# Patient Record
Sex: FEMALE | Race: WHITE | NOT HISPANIC OR LATINO | Employment: OTHER | URBAN - METROPOLITAN AREA
[De-identification: names, ages, dates, MRNs, and addresses within clinical notes are randomized per-mention and may not be internally consistent; named-entity substitution may affect disease eponyms.]

---

## 2023-03-20 LAB
ANION GAP IN SER/PLAS: 16 MMOL/L (ref 10–20)
CALCIUM (MG/DL) IN SER/PLAS: 9.9 MG/DL (ref 8.6–10.6)
CARBON DIOXIDE, TOTAL (MMOL/L) IN SER/PLAS: 29 MMOL/L (ref 21–32)
CHLORIDE (MMOL/L) IN SER/PLAS: 105 MMOL/L (ref 98–107)
CREATININE (MG/DL) IN SER/PLAS: 0.84 MG/DL (ref 0.5–1.05)
ERYTHROCYTE DISTRIBUTION WIDTH (RATIO) BY AUTOMATED COUNT: 13.3 % (ref 11.5–14.5)
ERYTHROCYTE MEAN CORPUSCULAR HEMOGLOBIN CONCENTRATION (G/DL) BY AUTOMATED: 30.8 G/DL (ref 32–36)
ERYTHROCYTE MEAN CORPUSCULAR VOLUME (FL) BY AUTOMATED COUNT: 99 FL (ref 80–100)
ERYTHROCYTES (10*6/UL) IN BLOOD BY AUTOMATED COUNT: 4.09 X10E12/L (ref 4–5.2)
ESTIMATED AVERAGE GLUCOSE FOR HBA1C: 108 MG/DL
GFR FEMALE: 73 ML/MIN/1.73M2
GLUCOSE (MG/DL) IN SER/PLAS: 104 MG/DL (ref 74–99)
HEMATOCRIT (%) IN BLOOD BY AUTOMATED COUNT: 40.3 % (ref 36–46)
HEMOGLOBIN (G/DL) IN BLOOD: 12.4 G/DL (ref 12–16)
HEMOGLOBIN A1C/HEMOGLOBIN TOTAL IN BLOOD: 5.4 %
LEUKOCYTES (10*3/UL) IN BLOOD BY AUTOMATED COUNT: 9.6 X10E9/L (ref 4.4–11.3)
NATRIURETIC PEPTIDE B (PG/ML) IN SER/PLAS: 69 PG/ML (ref 0–99)
NRBC (PER 100 WBCS) BY AUTOMATED COUNT: 0 /100 WBC (ref 0–0)
PLATELETS (10*3/UL) IN BLOOD AUTOMATED COUNT: 246 X10E9/L (ref 150–450)
POTASSIUM (MMOL/L) IN SER/PLAS: 4.9 MMOL/L (ref 3.5–5.3)
SODIUM (MMOL/L) IN SER/PLAS: 145 MMOL/L (ref 136–145)
UREA NITROGEN (MG/DL) IN SER/PLAS: 33 MG/DL (ref 6–23)

## 2023-08-30 LAB
ANION GAP IN SER/PLAS: 13 MMOL/L (ref 10–20)
BASOPHILS (10*3/UL) IN BLOOD BY AUTOMATED COUNT: 0.07 X10E9/L (ref 0–0.1)
BASOPHILS/100 LEUKOCYTES IN BLOOD BY AUTOMATED COUNT: 0.7 % (ref 0–2)
CALCIDIOL (25 OH VITAMIN D3) (NG/ML) IN SER/PLAS: 21 NG/ML
CALCIUM (MG/DL) IN SER/PLAS: 9.9 MG/DL (ref 8.6–10.6)
CARBON DIOXIDE, TOTAL (MMOL/L) IN SER/PLAS: 30 MMOL/L (ref 21–32)
CHLORIDE (MMOL/L) IN SER/PLAS: 103 MMOL/L (ref 98–107)
CREATININE (MG/DL) IN SER/PLAS: 0.94 MG/DL (ref 0.5–1.05)
EOSINOPHILS (10*3/UL) IN BLOOD BY AUTOMATED COUNT: 0.14 X10E9/L (ref 0–0.4)
EOSINOPHILS/100 LEUKOCYTES IN BLOOD BY AUTOMATED COUNT: 1.4 % (ref 0–6)
ERYTHROCYTE DISTRIBUTION WIDTH (RATIO) BY AUTOMATED COUNT: 13.3 % (ref 11.5–14.5)
ERYTHROCYTE MEAN CORPUSCULAR HEMOGLOBIN CONCENTRATION (G/DL) BY AUTOMATED: 30.9 G/DL (ref 32–36)
ERYTHROCYTE MEAN CORPUSCULAR VOLUME (FL) BY AUTOMATED COUNT: 101 FL (ref 80–100)
ERYTHROCYTES (10*6/UL) IN BLOOD BY AUTOMATED COUNT: 4.13 X10E12/L (ref 4–5.2)
GFR FEMALE: 64 ML/MIN/1.73M2
GLUCOSE (MG/DL) IN SER/PLAS: 84 MG/DL (ref 74–99)
HEMATOCRIT (%) IN BLOOD BY AUTOMATED COUNT: 41.8 % (ref 36–46)
HEMOGLOBIN (G/DL) IN BLOOD: 12.9 G/DL (ref 12–16)
IMMATURE GRANULOCYTES/100 LEUKOCYTES IN BLOOD BY AUTOMATED COUNT: 0.6 % (ref 0–0.9)
LEUKOCYTES (10*3/UL) IN BLOOD BY AUTOMATED COUNT: 9.7 X10E9/L (ref 4.4–11.3)
LYMPHOCYTES (10*3/UL) IN BLOOD BY AUTOMATED COUNT: 2.5 X10E9/L (ref 0.8–3)
LYMPHOCYTES/100 LEUKOCYTES IN BLOOD BY AUTOMATED COUNT: 25.7 % (ref 13–44)
MONOCYTES (10*3/UL) IN BLOOD BY AUTOMATED COUNT: 0.74 X10E9/L (ref 0.05–0.8)
MONOCYTES/100 LEUKOCYTES IN BLOOD BY AUTOMATED COUNT: 7.6 % (ref 2–10)
NEUTROPHILS (10*3/UL) IN BLOOD BY AUTOMATED COUNT: 6.21 X10E9/L (ref 1.6–5.5)
NEUTROPHILS/100 LEUKOCYTES IN BLOOD BY AUTOMATED COUNT: 64 % (ref 40–80)
NRBC (PER 100 WBCS) BY AUTOMATED COUNT: 0 /100 WBC (ref 0–0)
PLATELETS (10*3/UL) IN BLOOD AUTOMATED COUNT: 250 X10E9/L (ref 150–450)
POTASSIUM (MMOL/L) IN SER/PLAS: 4.7 MMOL/L (ref 3.5–5.3)
SODIUM (MMOL/L) IN SER/PLAS: 141 MMOL/L (ref 136–145)
URATE (MG/DL) IN SER/PLAS: 7.4 MG/DL (ref 2.3–6.7)
UREA NITROGEN (MG/DL) IN SER/PLAS: 43 MG/DL (ref 6–23)

## 2023-11-03 RX ORDER — LISINOPRIL AND HYDROCHLOROTHIAZIDE 20; 25 MG/1; MG/1
1 TABLET ORAL DAILY
Qty: 90 TABLET | Refills: 2 | Status: CANCELLED | OUTPATIENT
Start: 2023-11-03

## 2023-11-03 RX ORDER — LISINOPRIL AND HYDROCHLOROTHIAZIDE 20; 25 MG/1; MG/1
1 TABLET ORAL DAILY
COMMUNITY
Start: 2022-01-10 | End: 2024-01-02 | Stop reason: SDUPTHER

## 2023-12-31 DIAGNOSIS — I10 ESSENTIAL (PRIMARY) HYPERTENSION: ICD-10-CM

## 2024-01-02 RX ORDER — LISINOPRIL AND HYDROCHLOROTHIAZIDE 20; 25 MG/1; MG/1
TABLET ORAL
Qty: 90 TABLET | Refills: 2 | Status: SHIPPED | OUTPATIENT
Start: 2024-01-02

## 2024-06-05 ENCOUNTER — HOSPITAL ENCOUNTER (OUTPATIENT)
Dept: RADIOLOGY | Facility: CLINIC | Age: 74
Discharge: HOME | End: 2024-06-05
Payer: MEDICARE

## 2024-06-05 DIAGNOSIS — M25.561 RIGHT KNEE PAIN, UNSPECIFIED CHRONICITY: ICD-10-CM

## 2024-06-05 PROCEDURE — 73564 X-RAY EXAM KNEE 4 OR MORE: CPT | Mod: RT

## 2024-06-05 PROCEDURE — 73564 X-RAY EXAM KNEE 4 OR MORE: CPT | Mod: RIGHT SIDE | Performed by: INTERNAL MEDICINE

## 2024-07-12 ENCOUNTER — APPOINTMENT (OUTPATIENT)
Dept: ORTHOPEDIC SURGERY | Facility: CLINIC | Age: 74
End: 2024-07-12
Payer: MEDICARE

## 2024-07-12 DIAGNOSIS — M25.561 RIGHT KNEE PAIN, UNSPECIFIED CHRONICITY: ICD-10-CM

## 2024-07-26 ENCOUNTER — APPOINTMENT (OUTPATIENT)
Dept: ORTHOPEDIC SURGERY | Facility: CLINIC | Age: 74
End: 2024-07-26
Payer: MEDICARE

## 2024-08-13 ENCOUNTER — APPOINTMENT (OUTPATIENT)
Dept: ORTHOPEDIC SURGERY | Facility: CLINIC | Age: 74
End: 2024-08-13
Payer: MEDICARE

## 2024-08-27 ENCOUNTER — APPOINTMENT (OUTPATIENT)
Dept: ORTHOPEDIC SURGERY | Facility: CLINIC | Age: 74
End: 2024-08-27
Payer: MEDICARE

## 2024-09-01 DIAGNOSIS — M85.80 OTHER SPECIFIED DISORDERS OF BONE DENSITY AND STRUCTURE, UNSPECIFIED SITE: ICD-10-CM

## 2024-09-03 RX ORDER — CALCIUM CARBONATE/VITAMIN D3 600MG-5MCG
1 TABLET ORAL DAILY
Qty: 90 TABLET | Refills: 3 | Status: SHIPPED | OUTPATIENT
Start: 2024-09-03

## 2024-09-12 ENCOUNTER — APPOINTMENT (OUTPATIENT)
Dept: OPHTHALMOLOGY | Facility: CLINIC | Age: 74
End: 2024-09-12
Payer: MEDICARE

## 2024-09-12 DIAGNOSIS — H52.11 MYOPIA OF RIGHT EYE WITH ASTIGMATISM AND PRESBYOPIA: Primary | ICD-10-CM

## 2024-09-12 DIAGNOSIS — H04.123 DRY EYE SYNDROME OF BOTH EYES: ICD-10-CM

## 2024-09-12 DIAGNOSIS — Z96.1 PSEUDOPHAKIA OF BOTH EYES: ICD-10-CM

## 2024-09-12 DIAGNOSIS — H52.201 MYOPIA OF RIGHT EYE WITH ASTIGMATISM AND PRESBYOPIA: Primary | ICD-10-CM

## 2024-09-12 DIAGNOSIS — Z98.890 HISTORY OF LASER ASSISTED IN SITU KERATOMILEUSIS: ICD-10-CM

## 2024-09-12 DIAGNOSIS — H16.223 KERATOCONJUNCTIVITIS SICCA OF BOTH EYES NOT SPECIFIED AS SJOGREN'S: ICD-10-CM

## 2024-09-12 DIAGNOSIS — H52.4 MYOPIA OF RIGHT EYE WITH ASTIGMATISM AND PRESBYOPIA: Primary | ICD-10-CM

## 2024-09-12 PROBLEM — M85.80 OSTEOPENIA: Status: ACTIVE | Noted: 2024-09-12

## 2024-09-12 PROBLEM — H40.003 GLAUCOMA SUSPECT OF BOTH EYES: Status: ACTIVE | Noted: 2024-09-12

## 2024-09-12 PROBLEM — M17.9 KNEE OSTEOARTHRITIS: Status: ACTIVE | Noted: 2024-09-12

## 2024-09-12 PROBLEM — R53.83 FATIGUE: Status: ACTIVE | Noted: 2024-09-12

## 2024-09-12 PROBLEM — M35.3 POLYMYALGIA RHEUMATICA (MULTI): Status: ACTIVE | Noted: 2024-09-12

## 2024-09-12 PROBLEM — I10 HYPERTENSION: Status: ACTIVE | Noted: 2024-09-12

## 2024-09-12 PROBLEM — R51.9 HEADACHE: Status: ACTIVE | Noted: 2024-09-12

## 2024-09-12 PROCEDURE — 92015 DETERMINE REFRACTIVE STATE: CPT | Performed by: OPTOMETRIST

## 2024-09-12 PROCEDURE — 92004 COMPRE OPH EXAM NEW PT 1/>: CPT | Performed by: OPTOMETRIST

## 2024-09-12 RX ORDER — CYCLOSPORINE 0.5 MG/ML
1 EMULSION OPHTHALMIC EVERY 12 HOURS
Qty: 180 EACH | Refills: 0 | Status: SHIPPED | OUTPATIENT
Start: 2024-09-12 | End: 2024-12-11

## 2024-09-12 ASSESSMENT — CONF VISUAL FIELD
OS_INFERIOR_TEMPORAL_RESTRICTION: 0
OD_INFERIOR_TEMPORAL_RESTRICTION: 0
METHOD: COUNTING FINGERS
OD_SUPERIOR_TEMPORAL_RESTRICTION: 0
OD_NORMAL: 1
OS_SUPERIOR_NASAL_RESTRICTION: 0
OD_SUPERIOR_NASAL_RESTRICTION: 0
OS_INFERIOR_NASAL_RESTRICTION: 0
OS_SUPERIOR_TEMPORAL_RESTRICTION: 0
OS_NORMAL: 1
OD_INFERIOR_NASAL_RESTRICTION: 0

## 2024-09-12 ASSESSMENT — REFRACTION_MANIFEST
OD_SPHERE: -0.50
OS_ADD: +2.75
OS_SPHERE: +0.00
METHOD_AUTOREFRACTION: 1
OD_AXIS: 121
OD_ADD: +2.75
OD_CYLINDER: +1.50
OD_AXIS: 145
OD_SPHERE: -0.75
OS_SPHERE: +0.00
OS_CYLINDER: +0.25
OS_AXIS: 035
OS_CYLINDER: SPHERE
OD_CYLINDER: +1.50

## 2024-09-12 ASSESSMENT — REFRACTION_WEARINGRX
OS_SPHERE: +0.00
OD_SPHERE: -1.00
OS_AXIS: 175
SPECS_TYPE: PAL
OS_CYLINDER: +0.25
OD_AXIS: 140
OD_CYLINDER: +2.25
OD_ADD: +2.75
OS_ADD: +2.75

## 2024-09-12 ASSESSMENT — ENCOUNTER SYMPTOMS
PSYCHIATRIC NEGATIVE: 0
CONSTITUTIONAL NEGATIVE: 0
ENDOCRINE NEGATIVE: 0
CARDIOVASCULAR NEGATIVE: 0
MUSCULOSKELETAL NEGATIVE: 0
GASTROINTESTINAL NEGATIVE: 0
ALLERGIC/IMMUNOLOGIC NEGATIVE: 0
NEUROLOGICAL NEGATIVE: 0
HEMATOLOGIC/LYMPHATIC NEGATIVE: 0
RESPIRATORY NEGATIVE: 0
EYES NEGATIVE: 1

## 2024-09-12 ASSESSMENT — VISUAL ACUITY
METHOD: SNELLEN - LINEAR
OS_CC+: -2
OD_CC: 20/20
OS_CC: 20/20
CORRECTION_TYPE: GLASSES
OD_CC+: -2

## 2024-09-12 ASSESSMENT — CUP TO DISC RATIO
OD_RATIO: 0.1
OS_RATIO: 0.2

## 2024-09-12 ASSESSMENT — SLIT LAMP EXAM - LIDS
COMMENTS: NORMAL
COMMENTS: NORMAL

## 2024-09-12 ASSESSMENT — TONOMETRY
OD_IOP_MMHG: 16
OS_IOP_MMHG: 16
IOP_METHOD: GOLDMANN APPLANATION

## 2024-09-12 ASSESSMENT — EXTERNAL EXAM - LEFT EYE: OS_EXAM: NORMAL

## 2024-09-12 ASSESSMENT — EXTERNAL EXAM - RIGHT EYE: OD_EXAM: NORMAL

## 2024-09-12 NOTE — PROGRESS NOTES
Assessment/Plan   Diagnoses and all orders for this visit:  Myopia of right eye with astigmatism and presbyopia  New spec rx released today per patient request. Ocular health wnl for age OU. Monitor 1 year or sooner prn. Refraction billed today.    Dry eye syndrome of both eyes  Keratoconjunctivitis sicca of both eyes not specified as Sjogren's  -     cycloSPORINE (Restasis) 0.05 % ophthalmic emulsion; Administer 1 drop into both eyes every 12 hours.  Patient educated on exam findings. Patient failed treatement with aqueous and lipid based Ats for 120 days. Start Restasis OU  bid, previous use. Discussed SEs of drug. Discussed lag for full efficacy of drug. RTC 1 year or sooner if symptoms do not improve.     Pseudophakia of both eyes  History of laser assisted in situ keratomileusis  Stable. Monitor.    Per Dr Paty Camarillo, pt does not need office visit, can schedule and obtain Eliquis clearance

## 2024-09-28 NOTE — PROGRESS NOTES
"Subjective   Patient ID: Frances Coughlin is a 74 y.o. female who presents for New Patient Visit (Re-establish care for PMR. ).    HPI  Consult  RE \"PMR\"  I last saw her in Aug 8, 2021   · Polymyalgia rheumatica (725) (M35.3)   · On prednisone therapy (V58.65) (Z79.52)   · Primary osteoarthritis of first carpometacarpal joint of right hand (715.14) (M18.11)   · Knee osteoarthritis (715.36) (M17.10)    Tapered off pred 2022  Did well.     Joint pain ankles hips shoulder wrist hands thumbs   with dementia so did not come in  Onset 1.5 years         L knee TKR 2021    Daily joint pain  Worse with walking and Occ at night   AM gel 30 minutes     Vertigo    Worst is shoulder, ankles and hips     1 year ago flexion r 4th digit         ROS      Current Outpatient Medications   Medication Sig Dispense Refill    calcium carbonate-vitamin D3 600 mg-5 mcg (200 unit) tablet TAKE 1 TABLET DAILY 90 tablet 3    cycloSPORINE (Restasis) 0.05 % ophthalmic emulsion Administer 1 drop into both eyes every 12 hours. 180 each 0    lisinopriL-hydrochlorothiazide 20-25 mg tablet TAKE 1 TABLET BY MOUTH EVERY DAY---PT NEEDS APPT WITH NEW DOCTOR 90 tablet 2     No current facility-administered medications for this visit.         has a past medical history of Other specified personal risk factors, not elsewhere classified (11/12/2019).   Past Surgical History:   Procedure Laterality Date    KNEE ARTHROSCOPY W/ DEBRIDEMENT  09/22/2014    Arthroscopy Knee      Social History     Tobacco Use    Smoking status: Never    Smokeless tobacco: Never      family history is not on file.  Pain Management Panel           No data to display                 Vitals:    10/02/24 0759   BP: 137/77   Pulse: 69     Lab Results   Component Value Date    SEDRATE 11 07/07/2020    CRP 0.54 07/07/2020       PHYSICAL EXAM      NODES   HEART  LUNGS  ABDOMEN   VASCULAR  NEURO   SKIN  JOINTS normal range of motion of both shoulders with pain  Flexion deformity " of right fourth DIP  Squaring of CMC joint  Crepitus of left knee which has a total knee joint replacement  Normal range of motion of both ankles      PLAN  Diagnoses and all orders for this visit:  Primary osteoarthritis involving multiple joints  -     C-Reactive Protein; Future  -     CBC and Auto Differential; Future  -     Comprehensive Metabolic Panel; Future  -     Sedimentation Rate; Future  -     Referral to Physical Therapy; Future  Elevated C-reactive protein (CRP)  -     CBC and Auto Differential; Future    I do not find any clinical manifestations based on history or physical examination of recurrent polymyalgia rheumatica.    I last saw her 3 years ago and her PMR was controlled and she weaned herself off of prednisone and has been off it now for 2 years    I believe this is osteoarthritis rather than PMR and suggest physical therapy  She takes aspirin and although I cautioned her about GI toxicity she will continue to take this since it gives her excellent relief    I briefly discussed possibly adding tramadol if the physical therapy is not helping    I will check some lab work as the last lab work was in 2023 and will include sed rate and C-reactive protein    [facility]

## 2024-10-02 ENCOUNTER — APPOINTMENT (OUTPATIENT)
Dept: RHEUMATOLOGY | Facility: CLINIC | Age: 74
End: 2024-10-02
Payer: MEDICARE

## 2024-10-02 VITALS
DIASTOLIC BLOOD PRESSURE: 77 MMHG | HEART RATE: 69 BPM | SYSTOLIC BLOOD PRESSURE: 137 MMHG | HEIGHT: 62 IN | BODY MASS INDEX: 30.73 KG/M2 | WEIGHT: 167 LBS

## 2024-10-02 DIAGNOSIS — R79.82 ELEVATED C-REACTIVE PROTEIN (CRP): ICD-10-CM

## 2024-10-02 DIAGNOSIS — M15.0 PRIMARY OSTEOARTHRITIS INVOLVING MULTIPLE JOINTS: Primary | ICD-10-CM

## 2024-10-02 PROCEDURE — 1036F TOBACCO NON-USER: CPT | Performed by: INTERNAL MEDICINE

## 2024-10-02 PROCEDURE — 1125F AMNT PAIN NOTED PAIN PRSNT: CPT | Performed by: INTERNAL MEDICINE

## 2024-10-02 PROCEDURE — 1159F MED LIST DOCD IN RCRD: CPT | Performed by: INTERNAL MEDICINE

## 2024-10-02 PROCEDURE — 3008F BODY MASS INDEX DOCD: CPT | Performed by: INTERNAL MEDICINE

## 2024-10-02 PROCEDURE — 99204 OFFICE O/P NEW MOD 45 MIN: CPT | Performed by: INTERNAL MEDICINE

## 2024-10-02 PROCEDURE — 3078F DIAST BP <80 MM HG: CPT | Performed by: INTERNAL MEDICINE

## 2024-10-02 PROCEDURE — 3075F SYST BP GE 130 - 139MM HG: CPT | Performed by: INTERNAL MEDICINE

## 2024-10-02 ASSESSMENT — PAIN SCALES - GENERAL: PAINLEVEL: 4

## 2024-10-03 ENCOUNTER — HOSPITAL ENCOUNTER (OUTPATIENT)
Dept: RADIOLOGY | Facility: CLINIC | Age: 74
Discharge: HOME | End: 2024-10-03
Payer: MEDICARE

## 2024-10-03 ENCOUNTER — OFFICE VISIT (OUTPATIENT)
Dept: PRIMARY CARE | Facility: CLINIC | Age: 74
End: 2024-10-03
Payer: MEDICARE

## 2024-10-03 VITALS
HEIGHT: 63 IN | TEMPERATURE: 98.3 F | SYSTOLIC BLOOD PRESSURE: 114 MMHG | RESPIRATION RATE: 16 BRPM | OXYGEN SATURATION: 96 % | DIASTOLIC BLOOD PRESSURE: 69 MMHG | WEIGHT: 167.8 LBS | HEART RATE: 78 BPM | BODY MASS INDEX: 29.73 KG/M2

## 2024-10-03 DIAGNOSIS — I10 PRIMARY HYPERTENSION: Primary | ICD-10-CM

## 2024-10-03 DIAGNOSIS — R06.2 WHEEZING: ICD-10-CM

## 2024-10-03 DIAGNOSIS — R06.02 SHORTNESS OF BREATH: ICD-10-CM

## 2024-10-03 DIAGNOSIS — R60.0 LOWER LEG EDEMA: ICD-10-CM

## 2024-10-03 DIAGNOSIS — H81.13 BENIGN PAROXYSMAL POSITIONAL VERTIGO DUE TO BILATERAL VESTIBULAR DISORDER: ICD-10-CM

## 2024-10-03 DIAGNOSIS — R79.82 ELEVATED C-REACTIVE PROTEIN (CRP): ICD-10-CM

## 2024-10-03 DIAGNOSIS — Z00.00 ROUTINE GENERAL MEDICAL EXAMINATION AT HEALTH CARE FACILITY: ICD-10-CM

## 2024-10-03 DIAGNOSIS — Z12.31 ENCOUNTER FOR SCREENING MAMMOGRAM FOR MALIGNANT NEOPLASM OF BREAST: ICD-10-CM

## 2024-10-03 DIAGNOSIS — R73.9 HYPERGLYCEMIA: ICD-10-CM

## 2024-10-03 DIAGNOSIS — M15.0 PRIMARY OSTEOARTHRITIS INVOLVING MULTIPLE JOINTS: ICD-10-CM

## 2024-10-03 DIAGNOSIS — E55.9 VITAMIN D DEFICIENCY: ICD-10-CM

## 2024-10-03 DIAGNOSIS — H61.21 IMPACTED CERUMEN OF RIGHT EAR: ICD-10-CM

## 2024-10-03 LAB
25(OH)D3 SERPL-MCNC: 27 NG/ML (ref 30–100)
BASOPHILS # BLD AUTO: 0.06 X10*3/UL (ref 0–0.1)
BASOPHILS NFR BLD AUTO: 0.7 %
EOSINOPHIL # BLD AUTO: 0.18 X10*3/UL (ref 0–0.4)
EOSINOPHIL NFR BLD AUTO: 2.2 %
ERYTHROCYTE [DISTWIDTH] IN BLOOD BY AUTOMATED COUNT: 13.2 % (ref 11.5–14.5)
ERYTHROCYTE [SEDIMENTATION RATE] IN BLOOD BY WESTERGREN METHOD: 29 MM/H (ref 0–30)
HCT VFR BLD AUTO: 40.7 % (ref 36–46)
HGB BLD-MCNC: 12.8 G/DL (ref 12–16)
IMM GRANULOCYTES # BLD AUTO: 0.04 X10*3/UL (ref 0–0.5)
IMM GRANULOCYTES NFR BLD AUTO: 0.5 % (ref 0–0.9)
LYMPHOCYTES # BLD AUTO: 2.06 X10*3/UL (ref 0.8–3)
LYMPHOCYTES NFR BLD AUTO: 25.2 %
MCH RBC QN AUTO: 30.5 PG (ref 26–34)
MCHC RBC AUTO-ENTMCNC: 31.4 G/DL (ref 32–36)
MCV RBC AUTO: 97 FL (ref 80–100)
MONOCYTES # BLD AUTO: 0.63 X10*3/UL (ref 0.05–0.8)
MONOCYTES NFR BLD AUTO: 7.7 %
NEUTROPHILS # BLD AUTO: 5.21 X10*3/UL (ref 1.6–5.5)
NEUTROPHILS NFR BLD AUTO: 63.7 %
NRBC BLD-RTO: 0 /100 WBCS (ref 0–0)
PLATELET # BLD AUTO: 235 X10*3/UL (ref 150–450)
RBC # BLD AUTO: 4.2 X10*6/UL (ref 4–5.2)
WBC # BLD AUTO: 8.2 X10*3/UL (ref 4.4–11.3)

## 2024-10-03 PROCEDURE — 90662 IIV NO PRSV INCREASED AG IM: CPT | Performed by: NURSE PRACTITIONER

## 2024-10-03 PROCEDURE — 80061 LIPID PANEL: CPT | Performed by: NURSE PRACTITIONER

## 2024-10-03 PROCEDURE — 1159F MED LIST DOCD IN RCRD: CPT | Performed by: NURSE PRACTITIONER

## 2024-10-03 PROCEDURE — 3074F SYST BP LT 130 MM HG: CPT | Performed by: NURSE PRACTITIONER

## 2024-10-03 PROCEDURE — G0439 PPPS, SUBSEQ VISIT: HCPCS | Performed by: NURSE PRACTITIONER

## 2024-10-03 PROCEDURE — 85652 RBC SED RATE AUTOMATED: CPT | Performed by: NURSE PRACTITIONER

## 2024-10-03 PROCEDURE — 36415 COLL VENOUS BLD VENIPUNCTURE: CPT | Performed by: NURSE PRACTITIONER

## 2024-10-03 PROCEDURE — 82306 VITAMIN D 25 HYDROXY: CPT | Performed by: NURSE PRACTITIONER

## 2024-10-03 PROCEDURE — 99215 OFFICE O/P EST HI 40 MIN: CPT | Performed by: NURSE PRACTITIONER

## 2024-10-03 PROCEDURE — 71046 X-RAY EXAM CHEST 2 VIEWS: CPT

## 2024-10-03 PROCEDURE — 83036 HEMOGLOBIN GLYCOSYLATED A1C: CPT | Performed by: NURSE PRACTITIONER

## 2024-10-03 PROCEDURE — 3078F DIAST BP <80 MM HG: CPT | Performed by: NURSE PRACTITIONER

## 2024-10-03 PROCEDURE — 3008F BODY MASS INDEX DOCD: CPT | Performed by: NURSE PRACTITIONER

## 2024-10-03 PROCEDURE — 85025 COMPLETE CBC W/AUTO DIFF WBC: CPT | Performed by: NURSE PRACTITIONER

## 2024-10-03 PROCEDURE — 1125F AMNT PAIN NOTED PAIN PRSNT: CPT | Performed by: NURSE PRACTITIONER

## 2024-10-03 PROCEDURE — 86140 C-REACTIVE PROTEIN: CPT | Performed by: NURSE PRACTITIONER

## 2024-10-03 PROCEDURE — 80053 COMPREHEN METABOLIC PANEL: CPT | Performed by: NURSE PRACTITIONER

## 2024-10-03 RX ORDER — FUROSEMIDE 20 MG/1
20 TABLET ORAL DAILY
Qty: 90 TABLET | Refills: 2 | Status: SHIPPED | OUTPATIENT
Start: 2024-10-03

## 2024-10-03 RX ORDER — ALBUTEROL SULFATE 90 UG/1
2 INHALANT RESPIRATORY (INHALATION) EVERY 4 HOURS PRN
Qty: 8 G | Refills: 5 | Status: SHIPPED | OUTPATIENT
Start: 2024-10-03 | End: 2025-10-03

## 2024-10-03 RX ORDER — MECLIZINE HCL 12.5 MG 12.5 MG/1
12.5 TABLET ORAL 3 TIMES DAILY PRN
Qty: 30 TABLET | Refills: 11 | Status: SHIPPED | OUTPATIENT
Start: 2024-10-03 | End: 2025-10-03

## 2024-10-03 RX ORDER — FLUTICASONE PROPIONATE 50 MCG
1 SPRAY, SUSPENSION (ML) NASAL DAILY
Qty: 16 G | Refills: 11 | Status: SHIPPED | OUTPATIENT
Start: 2024-10-03 | End: 2025-10-03

## 2024-10-03 RX ORDER — ASPIRIN 81 MG
5 TABLET, DELAYED RELEASE (ENTERIC COATED) ORAL 2 TIMES DAILY
Qty: 15 ML | Refills: 0 | Status: SHIPPED | OUTPATIENT
Start: 2024-10-03 | End: 2024-10-13

## 2024-10-03 ASSESSMENT — ENCOUNTER SYMPTOMS
DEPRESSION: 0
LOSS OF SENSATION IN FEET: 0
OCCASIONAL FEELINGS OF UNSTEADINESS: 0

## 2024-10-03 ASSESSMENT — PATIENT HEALTH QUESTIONNAIRE - PHQ9
SUM OF ALL RESPONSES TO PHQ9 QUESTIONS 1 AND 2: 0
2. FEELING DOWN, DEPRESSED OR HOPELESS: NOT AT ALL
1. LITTLE INTEREST OR PLEASURE IN DOING THINGS: NOT AT ALL

## 2024-10-03 ASSESSMENT — PAIN SCALES - GENERAL: PAINLEVEL: 4

## 2024-10-03 NOTE — PROGRESS NOTES
"Subjective   Frances Coughlin is a 74 y.o. female who presents for Annual Exam and Flu Vaccine.  HPI  Ms. Coughlin is a 73 yo F here today for annual wellness.   Notes that about 3 weeks ago she experienced vertigo. Reports sensations of the room spinning with nausea. She notes worsening of the symptoms when she bent over. She tried epley maneuver on her own. Notes that she was then prescribed meclizine. She notes that symptoms have generally improved but still occur when she bends over and can occur moreso with positional change (sitting to standing).    Denies ear pain but notes some change in hearing from the R ear. Denies ear drainage.   She is very interested in vestibular therapy.     She is following for OA with Dr. Al. She feels that this is now better managed.     Additionally notes some recent shortness of breath/ change in breathing. Denies recent illness, fever, or chills.     All systems reviewed. Review of systems negative except for noted positives in HPI    Objective     /69   Pulse 78   Temp 36.8 °C (98.3 °F)   Resp 16   Ht 1.588 m (5' 2.5\")   Wt 76.1 kg (167 lb 12.8 oz)   SpO2 96%   BMI 30.20 kg/m²    Vital signs noted and reviewed.       Physical Exam  Constitutional:       Appearance: Normal appearance.   HENT:      Right Ear: There is impacted cerumen.   Cardiovascular:      Rate and Rhythm: Normal rate and regular rhythm.   Pulmonary:      Effort: Pulmonary effort is normal. No respiratory distress.      Breath sounds: Normal breath sounds.   Skin:     General: Skin is warm and dry.   Neurological:      Mental Status: She is oriented to person, place, and time.   Psychiatric:         Mood and Affect: Mood normal.             Assessment/Plan   Problem List Items Addressed This Visit       Hypertension - Primary    Relevant Orders    Lipid Panel (Completed)    Hemoglobin A1C (Completed)     Other Visit Diagnoses       Benign paroxysmal positional vertigo due to bilateral " vestibular disorder        Relevant Medications    meclizine (Antivert) 12.5 mg tablet    fluticasone (Flonase) 50 mcg/actuation nasal spray    Other Relevant Orders    Referral to Physical Therapy    Impacted cerumen of right ear        Relevant Medications    carbamide peroxide (Debrox) 6.5 % otic solution    Other Relevant Orders    Referral to ENT    Wheezing        Relevant Medications    albuterol (Ventolin HFA) 90 mcg/actuation inhaler    Other Relevant Orders    XR chest 2 views (Completed)    Shortness of breath        Relevant Medications    albuterol (Ventolin HFA) 90 mcg/actuation inhaler    Other Relevant Orders    XR chest 2 views (Completed)    Lower leg edema        Relevant Medications    furosemide (Lasix) 20 mg tablet    Other Relevant Orders    Elastic Bandage 5 in or greater    Vitamin D deficiency        Relevant Orders    Vitamin D 25-Hydroxy,Total (for eval of Vitamin D levels) (Completed)    Encounter for screening mammogram for malignant neoplasm of breast        Relevant Orders    BI mammo bilateral screening tomosynthesis    Hyperglycemia        Relevant Orders    Hemoglobin A1C (Completed)    Primary osteoarthritis involving multiple joints        Elevated C-reactive protein (CRP)        Routine general medical examination at health care facility        Relevant Orders    1 Year Follow Up In Primary Care - Wellness Exam

## 2024-10-03 NOTE — PATIENT INSTRUCTIONS
Thank you for coming in for your visit today!    Please follow up in 6 months or sooner if needed.     Use the lasix, as discussed.   Try ACE bandages and/or compression socks.   For your blood pressure:  Take your medication as directed. Try to take it around the same time daily.   Keep a log of your blood pressure. Be sure to bring it with you to your next appointment so we can review it together.  Adhere to the DASH diet. This includes decreasing your salt/sodium intake. Avoid canned foods, lunch meats, and frozen foods.  Exercise for 30 minutes daily.    Today we completed blood work. We will contact you with any abnormalities from this testing.    You may use an inhaler, as needed for wheezing/ shortness of breath.     Take meclizine for vertigo.   Use debrox to loosen ear wax.   Plan for consultation with ENT for removal.   If needed, you may schedule for vestibular therapy.    Call 911 or go to the emergency room if you have pain in your chest, difficulty breathing, or other life threatening symptoms.

## 2024-10-04 LAB
ALBUMIN SERPL BCP-MCNC: 4.7 G/DL (ref 3.4–5)
ALP SERPL-CCNC: 47 U/L (ref 33–136)
ALT SERPL W P-5'-P-CCNC: 14 U/L (ref 7–45)
ANION GAP SERPL CALC-SCNC: 16 MMOL/L (ref 10–20)
AST SERPL W P-5'-P-CCNC: 14 U/L (ref 9–39)
BILIRUB SERPL-MCNC: 0.9 MG/DL (ref 0–1.2)
BUN SERPL-MCNC: 29 MG/DL (ref 6–23)
CALCIUM SERPL-MCNC: 9.9 MG/DL (ref 8.6–10.6)
CHLORIDE SERPL-SCNC: 104 MMOL/L (ref 98–107)
CHOLEST SERPL-MCNC: 211 MG/DL (ref 0–199)
CHOLESTEROL/HDL RATIO: 4.8
CO2 SERPL-SCNC: 23 MMOL/L (ref 21–32)
CREAT SERPL-MCNC: 0.8 MG/DL (ref 0.5–1.05)
CRP SERPL-MCNC: 0.51 MG/DL
EGFRCR SERPLBLD CKD-EPI 2021: 77 ML/MIN/1.73M*2
EST. AVERAGE GLUCOSE BLD GHB EST-MCNC: 108 MG/DL
GLUCOSE SERPL-MCNC: 88 MG/DL (ref 74–99)
HBA1C MFR BLD: 5.4 %
HDLC SERPL-MCNC: 44.4 MG/DL
LDLC SERPL CALC-MCNC: 140 MG/DL
NON HDL CHOLESTEROL: 167 MG/DL (ref 0–149)
POTASSIUM SERPL-SCNC: 3.8 MMOL/L (ref 3.5–5.3)
PROT SERPL-MCNC: 7.1 G/DL (ref 6.4–8.2)
SODIUM SERPL-SCNC: 139 MMOL/L (ref 136–145)
TRIGL SERPL-MCNC: 134 MG/DL (ref 0–149)
VLDL: 27 MG/DL (ref 0–40)

## 2024-10-11 DIAGNOSIS — I10 ESSENTIAL (PRIMARY) HYPERTENSION: ICD-10-CM

## 2024-10-16 RX ORDER — LISINOPRIL AND HYDROCHLOROTHIAZIDE 20; 25 MG/1; MG/1
TABLET ORAL
Qty: 90 TABLET | Refills: 2 | Status: SHIPPED | OUTPATIENT
Start: 2024-10-16

## 2024-10-17 ENCOUNTER — EVALUATION (OUTPATIENT)
Dept: PHYSICAL THERAPY | Facility: CLINIC | Age: 74
End: 2024-10-17
Payer: MEDICARE

## 2024-10-17 DIAGNOSIS — M15.0 PRIMARY OSTEOARTHRITIS INVOLVING MULTIPLE JOINTS: ICD-10-CM

## 2024-10-17 DIAGNOSIS — G89.29 CHRONIC PAIN OF MULTIPLE JOINTS: Primary | ICD-10-CM

## 2024-10-17 DIAGNOSIS — M25.50 CHRONIC PAIN OF MULTIPLE JOINTS: Primary | ICD-10-CM

## 2024-10-17 PROCEDURE — 97162 PT EVAL MOD COMPLEX 30 MIN: CPT | Mod: GP

## 2024-10-17 ASSESSMENT — ENCOUNTER SYMPTOMS
LOSS OF SENSATION IN FEET: 0
OCCASIONAL FEELINGS OF UNSTEADINESS: 1
DEPRESSION: 0

## 2024-10-17 ASSESSMENT — PAIN - FUNCTIONAL ASSESSMENT: PAIN_FUNCTIONAL_ASSESSMENT: 0-10

## 2024-10-17 NOTE — PROGRESS NOTES
Physical Therapy    Physical Therapy Evaluation    Patient Name: Frances Coughlin  MRN: [id number]  Today's Date: 10/17/2024    Time Entry:  Time Calculation  Start Time: 0845  Stop Time: 0930  Time Calculation (min): 45 min  PT Evaluation Time Entry  PT Evaluation (Moderate) Time Entry: 45                    Visit #1  Insurance; Medicare  Cert; 10/17/2024 - 1/14/2025  Problem List Items Addressed This Visit             ICD-10-CM       Musculoskeletal and Injuries    Chronic pain of multiple joints - Primary M25.50, G89.29    Relevant Orders    Follow Up In Physical Therapy    Primary osteoarthritis involving multiple joints M15.0    Relevant Orders    Follow Up In Physical Therapy       Assessment  Patient presents with pain in multiple joints effecting quality of functional mobility and transfers from sitting/standing. Patient lacking guided home exercises regimen in order to better manage existing pains. ROM is functional in all joints. Strength is functional in all extremities, with MMT partially limited due to pain. POC discussed with patient's participation. Patient motivated to develop regimen and better manage pain. Frequency of recommended follow ups; 1w5. Patient will benefit from skilled PT for development of comprehensive HEP consisting of stretches and repeated movement exercises for shoulders, hips, knee, and ankles. OT assessment recommended if addressing small joints of hands required. Patient understands that referral from PCP or Rheumatologist would be required    Plan  Treatment/Interventions: Education/ Instruction, Gait training, Manual therapy, Therapeutic exercises  PT Plan: Skilled PT  Certification Period Start Date: 10/17/24  Certification Period End Date: 01/14/25  Recommended frequency; 1w5    Current Problem  1. Chronic pain of multiple joints  Follow Up In Physical Therapy      2. Primary osteoarthritis involving multiple joints  Referral to Physical Therapy    Follow Up In Physical  "Therapy          Subjective   General:  General  Reason for Referral: PT eval and treat; pain in multiple joints  Referred By: Mary Martel  Past Medical History Relevant to Rehab: Pain in multiple body joints; hands, elbows, shoulders, hips, knees, and ankles. The only joints where I don't have pain is neck (In constant pain in multiple joints since 2023)  General Comment: Not taking medication for pain management  Precautions:  Precautions  STEADI Fall Risk Score (The score of 4 or more indicates an increased risk of falling): 5  Vital Signs: NA     Pain:  Pain Assessment: 0-10  0-10 (Numeric) Pain Score:  (Constant joint pain 3/10, episoding pain as high as 7/10)  Pain Type: Chronic pain  Pain Location:  (multple upper and lower extremity joints. Most intnese pains felt in ankles, right knee and hip. Those pains are intermittent when intense)  Pain Descriptors:  (Constant pain is an ache. Right knee is an ache. Ankles may be episodic sharp pain. Hip are also episodic. Shoulders at constant low pain at rest. Reaching and lifting causes more significant pain but never more than 5/10)  Pain Frequency: Constant/continuous (Some constnat pain and some intermittent pain)  Pain Onset: Gradual  Clinical Progression: Gradually worsening  Effect of Pain on Daily Activities: If I sit for 15 minutes to 30 minutes - its hard to stand up and steady myself and start walking. Lifting causes more shoulder pain. Bending/squatting to help  change cloths makes it very hard on hips, knees, and ankles  Patient's Stated Pain Goal:  (\"I'd like to be able to get up from sitting and walking smoothly. I 'd like to sleep better at night, and I 'd like to feel less pain\")  Pain Interventions:  (Not exercising. I recent months and years taking care of )  Current activity walking dogs for 20 minutes, twice a day.  Also, the usual house work up and down stairs. Some stretching routine takes place 3-4 times a week  Home Living: lives " of , assisting him due to his disability. Partially retired      Prior Function Per Pt/Caregiver Report: independent        Objective   Posture: WNL     Range of Motion: Hips, ankles, shoulder ROM WFL  Left knee 0-120 (empty end feel)  Right knee 0-133      Strength: Shoulders  Flexion 5/5  Extensions 5/5  Abduction 5-/5  Ext rotation 5-/5  Int rotation 5/5     Flexibility: NA     Palpation: tenderness of knees (left more than right), tenderness of Quads, bilateral hips, shoulders and ankles     Special Tests: NA     Gait: antalgic gait  Speed = 1.11 meter/sec     Balance: good      Stairs: reciprocal antalgic stairs navigation presented     Outcome Measures:  LEFS =39/80   5 x sit/stand = 19 seconds     OP EDUCATION:  Outpatient Education  Individual(s) Educated: Patient  Education Provided: Anatomy, Body Mechanics, Home Exercise Program, POC  Diagnosis and Precautions: OA of multiple sites  Risk and Benefits Discussed with Patient/Caregiver/Other: yes  Patient/Caregiver Demonstrated Understanding: yes  Plan of Care Discussed and Agreed Upon: yes  Patient Response to Education: Patient/Caregiver Verbalized Understanding of Information  Education Comment: HEP    Goals:  Active       PT Problem       PT Goal 1       Start:  10/17/24    Expected End:  01/14/25       Patient will be able to demonstrated and report improved functional ambulation with different daily tasks and/or recreational activities, as evident by LEFS over 49         PT Goal 2       Start:  10/17/24    Expected End:  01/14/25       Patient will report reduced/abolished pain in multiple extremity joints, indicated by grade of 0-2 on 0-10 pain scale          PT Goal 3       Start:  10/17/24    Expected End:  01/14/25       Patient will demonstrated knowledge of HEP, its maintenance frequency, and associated benefits

## 2024-10-24 ENCOUNTER — HOSPITAL ENCOUNTER (OUTPATIENT)
Dept: RADIOLOGY | Facility: CLINIC | Age: 74
Discharge: HOME | End: 2024-10-24
Payer: MEDICARE

## 2024-10-24 VITALS — HEIGHT: 63 IN | WEIGHT: 168 LBS | BODY MASS INDEX: 29.77 KG/M2

## 2024-10-24 DIAGNOSIS — Z12.31 ENCOUNTER FOR SCREENING MAMMOGRAM FOR MALIGNANT NEOPLASM OF BREAST: ICD-10-CM

## 2024-10-24 PROCEDURE — 77067 SCR MAMMO BI INCL CAD: CPT

## 2024-11-12 ENCOUNTER — TREATMENT (OUTPATIENT)
Dept: PHYSICAL THERAPY | Facility: CLINIC | Age: 74
End: 2024-11-12
Payer: MEDICARE

## 2024-11-12 DIAGNOSIS — M25.50 CHRONIC PAIN OF MULTIPLE JOINTS: ICD-10-CM

## 2024-11-12 DIAGNOSIS — M15.0 PRIMARY OSTEOARTHRITIS INVOLVING MULTIPLE JOINTS: ICD-10-CM

## 2024-11-12 DIAGNOSIS — G89.29 CHRONIC PAIN OF MULTIPLE JOINTS: ICD-10-CM

## 2024-11-12 PROCEDURE — 97110 THERAPEUTIC EXERCISES: CPT | Mod: GP

## 2024-11-12 NOTE — PROGRESS NOTES
Physical Therapy    Physical Therapy follow up    Patient Name: Frances Coughlin  MRN: [id number]  Today's Date: 11/12/2024    Time Entry:  Time Calculation  Start Time: 1445  Stop Time: 1530  Time Calculation (min): 45 min     PT Therapeutic Procedures Time Entry  Therapeutic Exercise Time Entry: 45                 Visit #2  Insurance; Medicare  Cert; 10/17/2024 - 1/14/2025  Problem List Items Addressed This Visit             ICD-10-CM       Musculoskeletal and Injuries    Chronic pain of multiple joints M25.50, G89.29    Primary osteoarthritis involving multiple joints M15.0       Assessment  Patient presents with pain in multiple joints effecting quality of functional mobility and transfers from sitting/standing. Patient lacking guided home exercises regimen in order to better manage existing pains. ROM is functional in all joints. Strength is functional in all extremities, with MMT partially limited due to pain. POC discussed with patient's participation. Patient motivated to develop regimen and better manage pain. Frequency of recommended follow ups; 1w5. Patient will benefit from skilled PT for development of comprehensive HEP consisting of stretches and repeated movement exercises for shoulders, hips, knee, and ankles. OT assessment recommended if addressing small joints of hands required. Patient understands that referral from PCP or Rheumatologist would be required    Plan; patient education, there ex configuration, HEP development   Progress into repeated hip motions and shoulder repeated movement therapeutic exercises   Recommended frequency; 1w5    Current Problem  1. Primary osteoarthritis involving multiple joints  Follow Up In Physical Therapy      2. Chronic pain of multiple joints  Follow Up In Physical Therapy          Subjective   General:     Precautions:     Vital Signs: NA     Pain: Ankles, knees, wrists, and occasionally hips  Presently pain about 5/10 when I'm moving around     Current  activity walking dogs for 20 minutes, twice a day.  Also, the usual house work up and down stairs. Some stretching routine takes place 3-4 times a week  Home Living: lives of , assisting him due to his disability. Partially retired      Prior Function Per Pt/Caregiver Report: independent        Objective   Posture: WNL     Range of Motion: Hips, ankles, shoulder ROM WFL  Left knee 0-120 (empty end feel)  Right knee 0-133      Strength: Shoulders  Flexion 5/5  Extensions 5/5  Abduction 5-/5  Ext rotation 5-/5  Int rotation 5/5     Flexibility: NA     Palpation: tenderness of knees (left more than right), tenderness of Quads, bilateral hips, shoulders and ankles     Special Tests: NA     Gait: antalgic gait  Speed = 1.11 meter/sec     Balance: good      Stairs: reciprocal antalgic stairs navigation presented     Outcome Measures:  LEFS =39/80   5 x sit/stand = 19 seconds     PT intervention;   Strap assisted ankle dorsiflexion followed by active plantarflexion repeated ROM x 1 minute each  Strap assisted repeated ankle inversions and eversion x 1 minute each  Strap assisted heel slides x 15 reps each side  Isometric Quads contractions; 5 seconds duration x 20 reps  Seated Hamstrings stretches; 30 seconds x 3  Sit/stand with neutral spine    Access Code: [email or web address]  URL: [email or web address]  Date: 11/12/2024  Prepared by: Keshawn Wing    Exercises  - Long Sitting Calf Stretch with Strap  - 1 x daily - 7 x weekly - 3 sets - 10 reps  - Supine Heel Slide with Strap  - 1 x daily - 7 x weekly - 3 sets - 10 reps  - Long Sitting Ankle PROM Inversion Eversion  - 1 x daily - 7 x weekly - 3 sets - 10 reps  - Long Sitting Ankle PROM Inversion Eversion  - 1 x daily - 7 x weekly - 3 sets - 10 reps  - Supine Quad Set  - 1 x daily - 7 x weekly - 3 sets - 10 reps  - Seated Hamstring Stretch  - 1 x daily - 7 x weekly - 3 sets - 10 reps  - Supine Hamstring Stretch with Strap  - 1 x daily - 7 x weekly - 3  sets - 10 reps  - Seated Long Arc Quad  - 1 x daily - 7 x weekly - 3 sets - 10 reps  - Sit to Stand  - 1 x daily - 7 x weekly - 3 sets - 10 reps    Goals:  Active       PT Problem       PT Goal 1       Start:  10/17/24    Expected End:  01/14/25       Patient will be able to demonstrated and report improved functional ambulation with different daily tasks and/or recreational activities, as evident by LEFS over 49         PT Goal 2       Start:  10/17/24    Expected End:  01/14/25       Patient will report reduced/abolished pain in multiple extremity joints, indicated by grade of 0-2 on 0-10 pain scale          PT Goal 3       Start:  10/17/24    Expected End:  01/14/25       Patient will demonstrated knowledge of HEP, its maintenance frequency, and associated benefits

## 2024-11-18 NOTE — PROGRESS NOTES
AUDIOLOGY ADULT AUDIOMETRIC EVALUATION      Name:  Frances Coughlin   :  1950  Age:  74 y.o.  Date of Evaluation:  2024    Time: 8379-9813    IMPRESSIONS     Mild sensorineural hearing loss 125-4000 Hz falling to severe at 8000 Hz in both ears.  Word understanding in quiet is excellent in both ears.  Tympanometry indicates normal middle ear pressure and tympanic membrane mobility in both ears.     Amplification needs: Binaural amplification is recommended due to degree of hearing loss and patient's reported communication difficulties.    RECOMMENDATIONS     Continue medical follow up with primary care provider and/or Ears Nose and Throat (ENT) provider as recommended.  Return for audiologic evaluation annually to monitor hearing sensitivity and assess middle ear status or sooner should concerns arise. The audiology department can be reached at [phone] to schedule an appointment.   Consider amplification pending medical clearance. Check insurance benefit for hearing aid benefits and in-network providers. To schedule a hearing aid consultation with [facility] audiology department, call [phone]. Patient was provided with a copy of today's audiogram.  Avoid exposure to loud sounds by moving away from the noise, turning down the volume, or wearing proper hearing protection correctly.    HISTORY     Reason for visit:  Ms. Coughlin is seen today for an initial audiologic evaluation in conjunction with an evaluation with Aubrie Hutchison APRN.CNP due to vertigo/dizziness. On 10/3/24, she was diagnosed with benign paroxysmal positional vertigo due to bilateral vestibular disorder by her PCP. History obtained from patient report and chart review.     Change in Hearing: yes bilaterally, gradual over past couple of years  She reports hearing better when she flicks the tragus on the right ear.  She had a hearing test at [facility] 1-2 years ago and hearing aids were not  "recommended.  Difficult listening environments: hearing her  who has Parkinson's disease  Dizziness: yes, occurs when she bends over to assist her  and when she turns her head quickly.    EVALUATION     See scanned audiogram in \"Media\"     TEST RESULTS     Otoscopic Evaluation:  Right Ear: Cerumen which appeared to be non-occluding; however, tympanic membrane could not be visualized. Testing continued given tympanometry results.  Left Ear: Ear canal clear, tympanic membrane visualized.    Tympanometry (226 Hz):  Right Ear: Type A, middle ear pressure and tympanic membrane compliance within normal limits.   Left Ear: Type A, middle ear pressure and tympanic membrane compliance within normal limits.     Acoustic Reflexes:   Right Ear: (Ipsilateral) Responses present at 500-4000 Hz.  Left Ear: (Ipsilateral) Responses present at 500-4000 Hz.    Distortion Product Otoacoustic Emissions:  Right Ear: Did not test.  Left Ear:  Did not test.  Present OAEs suggest normal or near cochlear outer hair cell function for corresponding frequency region(s). Absent OAEs with normal middle ear function can be consistent with some degree of hearing loss. Assessment of cochlear outer hair cell function may be impacted by outer or middle ear function.    Test technique:  Pure Tone Audiometry via headphones  Reliability: Good    Pure Tone Audiometry:    Right Ear: Mild sensorineural hearing loss 125-4000 Hz falling to moderate to severe at 0672-4171 Hz.  Left Ear: Mild sensorineural hearing loss 125-4000 Hz falling to moderately-severe to severe at 4827-2591 Hz.    Speech Audiometry:   Right Ear:  Speech Reception Threshold (SRT) was obtained at 30 dB HL. Word Recognition scores were excellent (96%) in quiet when words were presented at 65 dB HL. These results are based on [facility] Auditory Test No.6 (NU-6) (N=25).   Left Ear:  Speech Reception Threshold (SRT) was obtained at 30 dB HL. Word Recognition scores " were excellent (96%) in quiet when words were presented at 65 dB HL. These results are based on [facility] Auditory Test No.6 (NU-6) (N=25).     Comparison of today's results with previous test results: No previous results available.         PATIENT EDUCATION     Discussed results and recommendations with Ms. Coughlin. Questions were addressed and the patient was encouraged to contact our department at [phone] should concerns arise.       Raeann Malave, CCC-A  Clinical Audiologist    Degree of   Hearing Sensitivity dB Range   Within Normal Limits (WNL) 0 - 20   Slight 25   Mild 26 - 40   Moderate 41 - 55   Moderately-Severe 56 - 70   Severe 71 - 90   Profound 91 +     Key   CHL Conductive Hearing Loss   ECV Ear Canal Volume   HA Hearing Aid   NIHL Noise-Induced Hearing Loss   PTA Pure Tone Average   SNHL Sensorineural Hearing Loss   TM Tympanic Membrane   WNL Within Normal Limits

## 2024-11-19 ENCOUNTER — CLINICAL SUPPORT (OUTPATIENT)
Dept: AUDIOLOGY | Facility: CLINIC | Age: 74
End: 2024-11-19
Payer: MEDICARE

## 2024-11-19 ENCOUNTER — APPOINTMENT (OUTPATIENT)
Dept: OTOLARYNGOLOGY | Facility: CLINIC | Age: 74
End: 2024-11-19
Payer: MEDICARE

## 2024-11-19 VITALS — TEMPERATURE: 97.7 F | WEIGHT: 167.6 LBS | BODY MASS INDEX: 28.61 KG/M2 | HEIGHT: 64 IN

## 2024-11-19 DIAGNOSIS — H90.3 SENSORINEURAL HEARING LOSS (SNHL) OF BOTH EARS: Primary | ICD-10-CM

## 2024-11-19 DIAGNOSIS — H61.21 IMPACTED CERUMEN OF RIGHT EAR: ICD-10-CM

## 2024-11-19 DIAGNOSIS — M43.6 NECK STIFFNESS: ICD-10-CM

## 2024-11-19 DIAGNOSIS — R42 VERTIGO: ICD-10-CM

## 2024-11-19 DIAGNOSIS — M54.2 NECK PAIN: ICD-10-CM

## 2024-11-19 DIAGNOSIS — H90.3 SENSORINEURAL HEARING LOSS (SNHL) OF BOTH EARS: ICD-10-CM

## 2024-11-19 DIAGNOSIS — R42 VERTIGO: Primary | ICD-10-CM

## 2024-11-19 PROCEDURE — 92557 COMPREHENSIVE HEARING TEST: CPT | Performed by: AUDIOLOGIST

## 2024-11-19 PROCEDURE — 92550 TYMPANOMETRY & REFLEX THRESH: CPT | Performed by: AUDIOLOGIST

## 2024-11-19 ASSESSMENT — PATIENT HEALTH QUESTIONNAIRE - PHQ9
SUM OF ALL RESPONSES TO PHQ9 QUESTIONS 1 AND 2: 0
1. LITTLE INTEREST OR PLEASURE IN DOING THINGS: NOT AT ALL
2. FEELING DOWN, DEPRESSED OR HOPELESS: NOT AT ALL

## 2024-11-19 NOTE — PROGRESS NOTES
Subjective   Patient ID: Francse Coughlin is a 74 y.o. female who presents for Dizziness.  HPI  This patient is referred for evaluation of  episodic vertiginous positional dizziness. The patient is not accompanied by anyone. The approximate duration of her complaints is weeks.    The patient describes her dizziness as episodes of spinning versus rocking that occur with specific head movements.  She notices this when turning her head quickly with driving or when assisting her  requiring her to bend forward.  When asked about ear pain, headache, phono-photophobia, visual or motion intolerance, sound or pressure induced symptoms, hearing loss, discharge from ear, tinnitus, aural fullness or autophony, the patient admits to rare headaches behind her eyes, mild photosensitivity, visual intolerance, mild motion intolerance, right-sided autophony and tinnitus when lying flat in bed.  She also endorses chronic neck pain/stiffness.  When asked about a significant past otological history including history of prior ear surgery, noise exposure, exposure to ototoxic drugs or agents, and/or family history of hearing loss, the patient admits to family history of presbycusis.    Review of Systems  A comprehensive or 10 points review of the patient's constitutional, neurological, HEENT, pulmonary, cardiovascular and genito-urinary systems showed only those mentioned in history of present illness.    Objective   Physical Exam  Constitutional: no fever, chills, weight loss or weight gain   General appearance: Appears well, well-nourished, well groomed. No acute distress.   Communication: Normal communication   Psychiatric: Oriented to person, place and time. Normal mood and affect.   Neurologic: Cranial nerves II-XII grossly intact and symmetric bilaterally.   Head and Face:   Head: Atraumatic with no masses, lesions or scarring.   Face: Normal symmetry, no paralysis, synkinesis or facial tic. No scars or deformities.    TMJ: Normal, no trismus.   Eyes: Conjunctiva not edematous or erythematous   Ears: External inspection of ears with no deformity, scars or masses.  Right canal with cerumen impaction.  Left canal clear.  TM intact.  No effusion or retraction noted.    Nose: External inspection of nose: No nasal lesions, lacerations or scars.   Neck: Normal appearing, symmetric, trachea midline.   Cardiovascular: Examination of peripheral vascular system shows no clubbing or cyanosis.   Respiratory: No respiratory distress increased work of breathing. Inspection of the chest with symmetric chest expansion and normal respiratory effort.   Skin: No rashes in the head or neck  Bedside occulomotor function assessment for ocular pursuits and saccades, spontaneous nystagmus was normal.  Bilateral head thrust negative  Romberg slightly unsteady, patient swaying front to back but able to compensate  Fukuda normal  Tandem gait unsteady, patient falling left  Ioana-Hallpike negative bilaterally    My interpretation of the audiogram done today is mild sensorineural hearing loss rising to normal at 1000 Hz then again sloping to moderate sensorineural hearing loss bilaterally.  Excellent word recognition scores and normal tympanograms bilaterally.  Assessment/Plan        This patient presents for initial evaluation of acute acquired right-sided cerumen impaction and vertigo as well as chronic neck pain, neck stiffness, bilateral sensorineural hearing loss.    Reassurance given that otologic exam is normal after cleaning.  We discussed that her right-sided autophony should resolve now that the cerumen has been removed.  If this continues to be an issue, I asked that she contact my office as this symptom is concerning for possible semicircular canal dehiscence.  Audiogram was reviewed in detail.  She has borderline candidate for hearing amplification but is not interested at this time.  The physiology of balance control was explained. The likely  possible etiologies were reviewed.  We discussed that positionally triggered vertigo is typically BPPV or cervicogenic in origin.  Testing today was negative for BPPV.  I recommended she see one of our vestibular physical therapist who can treat both of these issues.  She is currently in PT for arthritis and bilateral lower extremities.  She may have to complete that physical therapy before she starts another type of treatment.  Otherwise, she may follow-up with me as needed.  All questions were answered to patient's satisfaction.      45 minutes was spent on this patient's visit. More than 50% of that time was spent in counseling regarding the possible etiologies, test results, treatment options and coordinating care.    This note was created using speech recognition transcription software. Despite proofreading, several typographical errors might be present that might affect the meaning of the content. Please call with any questions.    Aubrie Hutchison, MEGHNA-CNP 11/19/24 10:53 AM

## 2024-11-20 ENCOUNTER — TREATMENT (OUTPATIENT)
Dept: PHYSICAL THERAPY | Facility: CLINIC | Age: 74
End: 2024-11-20
Payer: MEDICARE

## 2024-11-20 DIAGNOSIS — M25.50 CHRONIC PAIN OF MULTIPLE JOINTS: ICD-10-CM

## 2024-11-20 DIAGNOSIS — M15.0 PRIMARY OSTEOARTHRITIS INVOLVING MULTIPLE JOINTS: ICD-10-CM

## 2024-11-20 DIAGNOSIS — G89.29 CHRONIC PAIN OF MULTIPLE JOINTS: ICD-10-CM

## 2024-11-20 PROCEDURE — 97110 THERAPEUTIC EXERCISES: CPT | Mod: GP

## 2024-11-20 NOTE — PROGRESS NOTES
Physical Therapy    Physical Therapy follow up    Patient Name: Frances Coughlin  MRN: [id number]  Today's Date: 11/20/2024    Time Entry:  Time Calculation  Start Time: 0800  Stop Time: 0845  Time Calculation (min): 45 min     PT Therapeutic Procedures Time Entry  Therapeutic Exercise Time Entry: 45                 Visit #3  Insurance; Medicare  Cert; 10/17/2024 - 1/14/2025  Problem List Items Addressed This Visit             ICD-10-CM       Musculoskeletal and Injuries    Chronic pain of multiple joints M25.50, G89.29    Primary osteoarthritis involving multiple joints M15.0       Assessment  Patient presents with pain in multiple joints effecting quality of functional mobility and transfers from sitting/standing. Patient lacking guided home exercises regimen in order to better manage existing pains. ROM is functional in all joints. Strength is functional in all extremities, with MMT partially limited due to pain. POC discussed with patient's participation. Patient motivated to develop regimen and better manage pain. Frequency of recommended follow ups; 1w5. Patient will benefit from skilled PT for development of comprehensive HEP consisting of stretches and repeated movement exercises for shoulders, hips, knee, and ankles. OT assessment recommended if addressing small joints of hands required. Patient understands that referral from PCP or Rheumatologist would be required  11/19/2024; Patient able to reproduce all stretches, as instructed, without joint pain exacerbation. Next session may focus on neck and shoulder stretches   Plan; patient education, there ex configuration, HEP development   Progress into repeated hip motions and shoulder repeated movement therapeutic exercises   Recommended frequency; 1w5    Current Problem  1. Primary osteoarthritis involving multiple joints  Follow Up In Physical Therapy      2. Chronic pain of multiple joints  Follow Up In Physical Therapy          Subjective   General: I  did not  HEP handout from last session but I tried doing all exercises as recommended. It seems my feet are hurting worse for what ever reason.      Precautions:     Vital Signs: NA     Pain: Ankles, knees, wrists, and occasionally hips  Presently pain about 5/10 when I'm moving around     Current activity walking dogs for 20 minutes, twice a day.  Also, the usual house work up and down stairs. Some stretching routine takes place 3-4 times a week  Home Living: lives of , assisting him due to his disability. Partially retired      Prior Function Per Pt/Caregiver Report: independent        Objective   Posture: WNL     Range of Motion: Hips, ankles, shoulder ROM WFL  Left knee 0-120 (empty end feel)  Right knee 0-133      Strength: Shoulders  Flexion 5/5  Extensions 5/5  Abduction 5-/5  Ext rotation 5-/5  Int rotation 5/5     Flexibility: NA     Palpation: tenderness of knees (left more than right), tenderness of Quads, bilateral hips, shoulders and ankles     Special Tests: NA     Gait: antalgic gait  Speed = 1.11 meter/sec     Balance: good      Stairs: reciprocal antalgic stairs navigation presented     Outcome Measures:  LEFS =39/80   5 x sit/stand = 19 seconds     PT intervention;   Strap assisted ankle dorsiflexion followed by active plantarflexion repeated ROM x 1 minute each  Strap assisted repeated ankle inversions and eversion x 1 minute each  Strap assisted heel slides x 15 reps each side  Isometric Quads contractions; 5 seconds duration x 20 reps  Seated Hamstrings stretches; 30 seconds x 3  Sit/stand with neutral spine  Added hip adductors stretches, supine hip flexors stretches, standing ITB stretches  Added repeated lumbar flexions and seated Piriformis stretches   New handouts provided     Access Code: [email or web address]  URL: [email or web address]  Date: 11/12/2024  Prepared by: Keshawn Wing    Exercises  - Long Sitting Calf Stretch with Strap  - 1 x daily - 7 x weekly - 3  sets - 10 reps  - Supine Heel Slide with Strap  - 1 x daily - 7 x weekly - 3 sets - 10 reps  - Long Sitting Ankle PROM Inversion Eversion  - 1 x daily - 7 x weekly - 3 sets - 10 reps  - Long Sitting Ankle PROM Inversion Eversion  - 1 x daily - 7 x weekly - 3 sets - 10 reps  - Supine Quad Set  - 1 x daily - 7 x weekly - 3 sets - 10 reps  - Seated Hamstring Stretch  - 1 x daily - 7 x weekly - 3 sets - 10 reps  - Supine Hamstring Stretch with Strap  - 1 x daily - 7 x weekly - 3 sets - 10 reps  - Seated Long Arc Quad  - 1 x daily - 7 x weekly - 3 sets - 10 reps  - Sit to Stand  - 1 x daily - 7 x weekly - 3 sets - 10 reps    Goals:  Active       PT Problem       PT Goal 1       Start:  10/17/24    Expected End:  01/14/25       Patient will be able to demonstrated and report improved functional ambulation with different daily tasks and/or recreational activities, as evident by LEFS over 49         PT Goal 2       Start:  10/17/24    Expected End:  01/14/25       Patient will report reduced/abolished pain in multiple extremity joints, indicated by grade of 0-2 on 0-10 pain scale          PT Goal 3       Start:  10/17/24    Expected End:  01/14/25       Patient will demonstrated knowledge of HEP, its maintenance frequency, and associated benefits

## 2024-11-26 ENCOUNTER — TREATMENT (OUTPATIENT)
Dept: PHYSICAL THERAPY | Facility: CLINIC | Age: 74
End: 2024-11-26
Payer: MEDICARE

## 2024-11-26 DIAGNOSIS — M15.0 PRIMARY OSTEOARTHRITIS INVOLVING MULTIPLE JOINTS: Primary | ICD-10-CM

## 2024-11-26 DIAGNOSIS — M25.50 CHRONIC PAIN OF MULTIPLE JOINTS: ICD-10-CM

## 2024-11-26 DIAGNOSIS — G89.29 CHRONIC PAIN OF MULTIPLE JOINTS: ICD-10-CM

## 2024-11-26 PROCEDURE — 97110 THERAPEUTIC EXERCISES: CPT | Mod: GP

## 2024-11-26 NOTE — PROGRESS NOTES
Physical Therapy    Physical Therapy    Physical Therapy follow up    Patient Name: Frances Coughlin  MRN: [id number]  Today's Date: 11/26/2024    Time Entry:  Time Calculation  Start Time: 0845  Stop Time: 0925  Time Calculation (min): 40 min     PT Therapeutic Procedures Time Entry  Therapeutic Exercise Time Entry: 40                 Visit #4  Insurance; Medicare  Cert; 10/17/2024 - 1/14/2025  Problem List Items Addressed This Visit             ICD-10-CM       Musculoskeletal and Injuries    Chronic pain of multiple joints M25.50, G89.29    Primary osteoarthritis involving multiple joints - Primary M15.0       Assessment  Patient presents with pain in multiple joints effecting quality of functional mobility and transfers from sitting/standing. Patient lacking guided home exercises regimen in order to better manage existing pains. ROM is functional in all joints. Strength is functional in all extremities, with MMT partially limited due to pain. POC discussed with patient's participation. Patient motivated to develop regimen and better manage pain. Frequency of recommended follow ups; 1w5. Patient will benefit from skilled PT for development of comprehensive HEP consisting of stretches and repeated movement exercises for shoulders, hips, knee, and ankles. OT assessment recommended if addressing small joints of hands required. Patient understands that referral from PCP or Rheumatologist would be required  11/19/2024; Patient able to reproduce all stretches, as instructed, without joint pain exacerbation. Next session may focus on neck and shoulder stretches   Plan; patient education, there ex configuration, HEP development   Progress into repeated hip motions and shoulder repeated movement therapeutic exercises   Recommended frequency; 1w5    Current Problem  1. Primary osteoarthritis involving multiple joints  Follow Up In Physical Therapy      2. Chronic pain of multiple joints  Follow Up In Physical Therapy           Subjective   General: I did not  HEP handout from last session but I tried doing all exercises as recommended. It seems my feet are hurting worse for what ever reason.      Precautions:     Vital Signs: NA     Pain: Ankles, knees, wrists, and occasionally hips  Presently pain about 5/10 when I'm moving around     Current activity walking dogs for 20 minutes, twice a day.  Also, the usual house work up and down stairs. Some stretching routine takes place 3-4 times a week  Home Living: lives of , assisting him due to his disability. Partially retired      Prior Function Per Pt/Caregiver Report: independent        Objective   Posture: WNL     Range of Motion: Hips, ankles, shoulder ROM WFL  Left knee 0-120 (empty end feel)  Right knee 0-133      Strength: Shoulders  Flexion 5/5  Extensions 5/5  Abduction 5-/5  Ext rotation 5-/5  Int rotation 5/5     Flexibility: NA     Palpation: tenderness of knees (left more than right), tenderness of Quads, bilateral hips, shoulders and ankles     Special Tests: NA     Gait: antalgic gait  Speed = 1.11 meter/sec     Balance: good      Stairs: reciprocal antalgic stairs navigation presented     Outcome Measures:  LEFS =39/80   5 x sit/stand = 19 seconds     PT intervention;   Pulleys - shoulder flexion, abduction, extensions, internal rotations behind back  Wall slides  Shoulder horizontal adductions  Doorway/corner stretches  C-spine repeated flexions  C-spine retractions and repeated extension  Bilateral Upper Trapezius stretches  Repeated patient assisted C-spine rotation to left/right  New handout supporting all exercises provided     Goals:  Active       PT Problem       PT Goal 1       Start:  10/17/24    Expected End:  01/14/25       Patient will be able to demonstrated and report improved functional ambulation with different daily tasks and/or recreational activities, as evident by LEFS over 49         PT Goal 2       Start:  10/17/24    Expected End:   01/14/25       Patient will report reduced/abolished pain in multiple extremity joints, indicated by grade of 0-2 on 0-10 pain scale          PT Goal 3       Start:  10/17/24    Expected End:  01/14/25       Patient will demonstrated knowledge of HEP, its maintenance frequency, and associated benefits

## 2024-12-03 NOTE — PROGRESS NOTES
Physical Therapy    Physical Therapy Evaluation and Treatment      Patient Name: Frances Coughlin  MRN: [id number]  Today's Date: 12/17/2024    Time Entry:   Time Calculation  Start Time: 1700  Stop Time: 1800  Time Calculation (min): 60 min  PT Evaluation Time Entry  PT Evaluation (Low) Time Entry: 30  PT Therapeutic Procedures Time Entry  Neuromuscular Re-Education Time Entry: 15  Therapeutic Exercise Time Entry: 10                   Assessment:        Patient presents with a diagnosis of vertigo with associated decreased ROM, balance, increased fall risk and decreased knowledge of how to manage symptoms independently, difficulty with community ambulation, difficulty with stair negotiation, functional tasks including bending forward, bed mobility, looking up and overall decreased quality of life.  Patient VNG testing was negative for BPPV and distraction in test positions decreased symptoms.  The patient can benefit from skilled therapy interventions to address the patient's deficits and maximize the quality of her life.    Plan:  OP PT Plan  Treatment/Interventions: Education/ Instruction, Canalith repositioning, Neuromuscular re-education, Therapeutic activities, Therapeutic exercises  PT Plan: Skilled PT  PT Frequency: 1 time per week  Duration: 12 weeks  Onset Date: 11/19/24  Certification Period Start Date: 12/09/24  Certification Period End Date: 03/09/25  Number of Treatments Authorized: 20 per year  Rehab Potential: Good  Plan of Care Agreement: Patient    Current Problem:   Problem List Items Addressed This Visit    None  Visit Diagnoses         Codes    Vertigo    -  Primary R42    Relevant Orders    Follow Up In Physical Therapy    Neck pain     M54.2    Relevant Orders    Follow Up In Physical Therapy    Neck stiffness     M43.6    Relevant Orders    Follow Up In Physical Therapy            Subjective    Current symptoms:  Dizziness currently 0/10 last week 0-6/10 any rapid head movements, bending  to the ground looking up and maybe rolling in bed.  Room movement lasts for last for 10-15 seconds.    Floating sensation:   Currently 0/10, last week 0-7/10 lasting for 10-12 seconds usually with dizziness.    Neck pain currently 4/10, last week 0-7/10    General:  LIZET SANCHES NOTE DATED 11/19/2024:  Frances Coughlin is a 74 y.o. female who presents for Dizziness.  HPI  This patient is referred for evaluation of  episodic vertiginous positional dizziness. The patient is not accompanied by anyone. The approximate duration of her complaints is weeks.    The patient describes her dizziness as episodes of spinning versus rocking that occur with specific head movements.  She notices this when turning her head quickly with driving or when assisting her  requiring her to bend forward.  When asked about ear pain, headache, phono-photophobia, visual or motion intolerance, sound or pressure induced symptoms, hearing loss, discharge from ear, tinnitus, aural fullness or autophony, the patient admits to rare headaches behind her eyes, mild photosensitivity, visual intolerance, mild motion intolerance, right-sided autophony and tinnitus when lying flat in bed.  She also endorses chronic neck pain/stiffness.  When asked about a significant past otological history including history of prior ear surgery, noise exposure, exposure to ototoxic drugs or agents, and/or family history of hearing loss, the patient admits to family history of presbycusis.    Assessment/Plan    This patient presents for initial evaluation of acute acquired right-sided cerumen impaction and vertigo as well as chronic neck pain, neck stiffness, bilateral sensorineural hearing loss.     Reassurance given that otologic exam is normal after cleaning.  We discussed that her right-sided autophony should resolve now that the cerumen has been removed.  If this continues to be an issue, I asked that she contact my office as this symptom is  concerning for possible semicircular canal dehiscence.  Audiogram was reviewed in detail.  She has borderline candidate for hearing amplification but is not interested at this time.  The physiology of balance control was explained. The likely possible etiologies were reviewed.  We discussed that positionally triggered vertigo is typically BPPV or cervicogenic in origin.  Testing today was negative for BPPV.  I recommended she see one of our vestibular physical therapist who can treat both of these issues.  She is currently in PT for arthritis and bilateral lower extremities.  She may have to complete that physical therapy before she starts another type of treatment.  Otherwise, she may follow-up with me as needed.  All questions were answered to patient's satisfaction.     Precautions:     Vital Signs:     Pain:       Prior Level of Function:     Independent without dizziness    Objective   CERVICAL ROM:    AROM PROM   EXTENSION 40 Room movement   FLEXION 45    RIGHT ROTATION 58    LEFT ROTATION 35    RIGHT LATERAL FLEXION 20    LEFT LATERAL FLEXION 10      CERVICAL SPECIAL TEST:  TRANSVERSE LIGAMENT  NEG   SHARP-DAISY NEG      VERTEBRAL ARTERY SUBJECTIVE REPORTING FOR THE FOLLOWING:     NEGATIVE POSITIVE   DYPLOPIA X    DYSARTHRIA X    DYSPHAGIA X    NYSTAGMUS X    DROP ATTACK X    NAUSEA X    FACIAL NUMBNESS X    DIZZINESS  With position changes       ALL OF THE BELOW WITH GOGGLES UNLESS OTHERWISE NOTED:  SPONTANEOUS neg   GAZE EVOKED In direction of gaze all directions     POSITIONAL TESTING:  POSITION NYSTAGMUS DIRECTION DIZZINESS DURATION(SECONDS)   HEAD CENTER  off/10    ROLL RIGHT  off/10    ROLL LEFT  off/10    JANELLE HALPIKE RIGHT  off/10    JANELLE HALPIKE LEFT  off/10    Patient with decreased symptoms with distraction.    STATIC and DYNAMIC BALANCE:  Will be assessed in the future    Outcome Measures:        Treatments:  Fold overs in standing cue to use sensation of touch, keep eyes open and use touch to stop  "postural sway excessively    Levator scap 3 times 30\" *   Upper trap 3 times 30\" *     EDUCATION:       Goals:  Active       PT Problem       PT Goal 1:  Patient decrease DHI to 20 points for self reported improvement in function.        Start:  12/09/24    Expected End:  03/09/25            PT Goal 2:  Patient will increase cervical rotation to 0-60 degrees to allow the patient to scan environment without pain.        Start:  12/09/24    Expected End:  03/09/25            PT Goal 3:  Patient will report her dizziness at 0-3/10 at works to allow increased ease in caring for her .       Start:  12/09/24    Expected End:  03/09/25            PT Goal 4:  Will assess balance static and dynamically       Start:  12/09/24    Expected End:  03/09/25            PT Goal 5:  Patient will be independent in Home Exercise Program to manage symptoms and maximize their functional independence.        Start:  12/09/24    Expected End:  03/09/25                      "

## 2024-12-04 ENCOUNTER — APPOINTMENT (OUTPATIENT)
Dept: PHYSICAL THERAPY | Facility: CLINIC | Age: 74
End: 2024-12-04
Payer: MEDICARE

## 2024-12-04 ENCOUNTER — DOCUMENTATION (OUTPATIENT)
Dept: PHYSICAL THERAPY | Facility: CLINIC | Age: 74
End: 2024-12-04
Payer: MEDICARE

## 2024-12-04 NOTE — PROGRESS NOTES
Physical Therapy                 Therapy Communication Note    Patient Name: Frances Coughlin  MRN: [id number]  Department:   Room: Room/bed info not found  Today's Date: 12/4/2024     Discipline: Physical Therapy    Missed Visit Reason:      Missed Time: Cancel    Comment: visit cancelled per previous agreement with patient

## 2024-12-09 ENCOUNTER — CLINICAL SUPPORT (OUTPATIENT)
Dept: PHYSICAL THERAPY | Facility: CLINIC | Age: 74
End: 2024-12-09
Payer: MEDICARE

## 2024-12-09 DIAGNOSIS — M43.6 NECK STIFFNESS: ICD-10-CM

## 2024-12-09 DIAGNOSIS — M54.2 NECK PAIN: ICD-10-CM

## 2024-12-09 DIAGNOSIS — R42 VERTIGO: Primary | ICD-10-CM

## 2024-12-09 ASSESSMENT — ENCOUNTER SYMPTOMS
LOSS OF SENSATION IN FEET: 0
OCCASIONAL FEELINGS OF UNSTEADINESS: 0
DEPRESSION: 0

## 2024-12-09 ASSESSMENT — PAIN - FUNCTIONAL ASSESSMENT: PAIN_FUNCTIONAL_ASSESSMENT: 0-10

## 2024-12-11 ENCOUNTER — APPOINTMENT (OUTPATIENT)
Dept: PHYSICAL THERAPY | Facility: CLINIC | Age: 74
End: 2024-12-11
Payer: MEDICARE

## 2024-12-13 DIAGNOSIS — H16.223 KERATOCONJUNCTIVITIS SICCA OF BOTH EYES NOT SPECIFIED AS SJOGREN'S: ICD-10-CM

## 2024-12-13 RX ORDER — CYCLOSPORINE 0.5 MG/ML
1 EMULSION OPHTHALMIC EVERY 12 HOURS
Qty: 180 EACH | Refills: 2 | Status: SHIPPED | OUTPATIENT
Start: 2024-12-13 | End: 2025-03-13

## 2024-12-20 ENCOUNTER — TREATMENT (OUTPATIENT)
Dept: PHYSICAL THERAPY | Facility: CLINIC | Age: 74
End: 2024-12-20
Payer: MEDICARE

## 2024-12-20 DIAGNOSIS — M43.6 NECK STIFFNESS: ICD-10-CM

## 2024-12-20 DIAGNOSIS — R42 VERTIGO: ICD-10-CM

## 2024-12-20 DIAGNOSIS — M54.2 NECK PAIN: ICD-10-CM

## 2024-12-20 PROCEDURE — 97112 NEUROMUSCULAR REEDUCATION: CPT | Mod: GP | Performed by: PHYSICAL THERAPIST

## 2024-12-20 ASSESSMENT — PAIN - FUNCTIONAL ASSESSMENT: PAIN_FUNCTIONAL_ASSESSMENT: 0-10

## 2024-12-20 ASSESSMENT — PAIN SCALES - GENERAL: PAINLEVEL_OUTOF10: 2

## 2024-12-20 NOTE — PROGRESS NOTES
"Physical Therapy    Physical Therapy Treatment    Patient Name: Frances Coughlin  MRN: [id number]  Today's Date: 12/20/2024    Time Entry:   Time Calculation  Start Time: 1030  Stop Time: 1115  Time Calculation (min): 45 min     PT Therapeutic Procedures Time Entry  Neuromuscular Re-Education Time Entry: 40                   Assessment:     Patient with improved gait with cues for for heel strike and walking faster.  Patient with out foot scuffing.    Plan:     Add corner quick turns and corner balance.    Current Problem  Problem List Items Addressed This Visit    None  Visit Diagnoses         Codes    Vertigo     R42    Neck pain     M54.2    Neck stiffness     M43.6          Subjective    Patient states that she think her neck movement is better.  Patient states that she has no weird head today, but has not had to do any care for her .    Precautions     Vital Signs     Pain  Pain Assessment  Pain Assessment: 0-10  0-10 (Numeric) Pain Score: 2 (neck)    Objective     Outcome Measures:     FGA:  14     Treatments:    12/20/2024:  Calf stretch 3 positions with foam roll 3 times 30\" *    Gait working on increased stride length, heel strike and pranav to normalize gait.    EVAL:  Fold overs in standing cue to use sensation of touch, keep eyes open and use touch to stop postural sway excessively     Levator scap 3 times 30\" *   Upper trap 3 times 30\" *   OP EDUCATION:       Goals:  Active       PT Problem       PT Goal 1:  Patient decrease DHI to 20 points for self reported improvement in function.        Start:  12/09/24    Expected End:  03/09/25            PT Goal 2:  Patient will increase cervical rotation to 0-60 degrees to allow the patient to scan environment without pain.        Start:  12/09/24    Expected End:  03/09/25            PT Goal 3:  Patient will report her dizziness at 0-3/10 at works to allow increased ease in caring for her .       Start:  12/09/24    Expected End:  03/09/25    "         PT Goal 4:  Will assess balance static and dynamically       Start:  12/09/24    Expected End:  03/09/25            PT Goal 5:  Patient will be independent in Home Exercise Program to manage symptoms and maximize their functional independence.        Start:  12/09/24    Expected End:  03/09/25

## 2024-12-22 NOTE — PROGRESS NOTES
"Physical Therapy    Physical Therapy Treatment    Patient Name: Frances Coughlin  MRN: [id number]  Today's Date: 12/24/2024      Time Entry:                           Visit number: Visit count could not be calculated. Make sure you are using a visit which is associated with an episode.  Insurance:  Medicare  POC: 12/9/2024-3/9/2025  Primary diagnosis:  Vertigo    Assessment:     Patient with improved gait with cues for for heel strike and walking faster.  Patient with out foot scuffing.    Plan:     Add corner quick turns and corner balance.    Current Problem  Problem List Items Addressed This Visit    None  Visit Diagnoses         Codes    Vertigo    -  Primary R42    Neck pain     M54.2    Neck stiffness     M43.6            Subjective    Patient states that she think her neck movement is better.  Patient states that she has no weird head today, but has not had to do any care for her .    Precautions     Vital Signs     Pain       Objective     Outcome Measures:     FGA:  14     Treatments:    12/20/2024:  Calf stretch 3 positions with foam roll 3 times 30\" *    Gait working on increased stride length, heel strike and pranav to normalize gait.    EVAL:  Fold overs in standing cue to use sensation of touch, keep eyes open and use touch to stop postural sway excessively     Levator scap 3 times 30\" *   Upper trap 3 times 30\" *   OP EDUCATION:       Goals:    "

## 2024-12-24 ENCOUNTER — APPOINTMENT (OUTPATIENT)
Dept: PHYSICAL THERAPY | Facility: CLINIC | Age: 74
End: 2024-12-24
Payer: MEDICARE

## 2024-12-24 DIAGNOSIS — R42 VERTIGO: Primary | ICD-10-CM

## 2024-12-24 DIAGNOSIS — M43.6 NECK STIFFNESS: ICD-10-CM

## 2024-12-24 DIAGNOSIS — M54.2 NECK PAIN: ICD-10-CM

## 2024-12-26 ENCOUNTER — TREATMENT (OUTPATIENT)
Dept: PHYSICAL THERAPY | Facility: CLINIC | Age: 74
End: 2024-12-26
Payer: MEDICARE

## 2024-12-26 DIAGNOSIS — M15.0 PRIMARY OSTEOARTHRITIS INVOLVING MULTIPLE JOINTS: ICD-10-CM

## 2024-12-26 DIAGNOSIS — G89.29 CHRONIC PAIN OF MULTIPLE JOINTS: ICD-10-CM

## 2024-12-26 DIAGNOSIS — M25.50 CHRONIC PAIN OF MULTIPLE JOINTS: ICD-10-CM

## 2024-12-26 PROCEDURE — 97110 THERAPEUTIC EXERCISES: CPT | Mod: GP

## 2024-12-26 NOTE — PROGRESS NOTES
Physical Therapy    Physical Therapy    Physical Therapy follow up    Patient Name: Frances Coughlin  MRN: [id number]  Today's Date: 12/26/2024    Time Entry:  Time Calculation  Start Time: 0930  Stop Time: 1015  Time Calculation (min): 45 min     PT Therapeutic Procedures Time Entry  Therapeutic Exercise Time Entry: 45                 Visit #5  Insurance; Medicare  Cert; 10/17/2024 - 1/14/2025  Problem List Items Addressed This Visit             ICD-10-CM       Musculoskeletal and Injuries    Chronic pain of multiple joints M25.50, G89.29    Primary osteoarthritis involving multiple joints M15.0       Assessment  Patient presents with pain in multiple joints effecting quality of functional mobility and transfers from sitting/standing. Patient lacking guided home exercises regimen in order to better manage existing pains. ROM is functional in all joints. Strength is functional in all extremities, with MMT partially limited due to pain. POC discussed with patient's participation. Patient motivated to develop regimen and better manage pain. Frequency of recommended follow ups; 1w5. Patient will benefit from skilled PT for development of comprehensive HEP consisting of stretches and repeated movement exercises for shoulders, hips, knee, and ankles. OT assessment recommended if addressing small joints of hands required. Patient understands that referral from PCP or Rheumatologist would be required  12/26/2024; Patient able to reproduce all stretches, as instructed, without joint pain exacerbation. One session for final HEP remaining  Plan; patient education, there ex configuration, HEP development   Progress into repeated hip motions and shoulder repeated movement therapeutic exercises   Recommended frequency; 1w5    Current Problem  1. Primary osteoarthritis involving multiple joints  Follow Up In Physical Therapy      2. Chronic pain of multiple joints  Follow Up In Physical Therapy          Subjective    General:Sick for last couple of weeks and had to stop some of exercises because I did not feel like it. It all started after last COVID booster shot. Not positive for flu or Covid and doing better now.      Precautions: NA    Pain: Ankles 5/10, shoulders 2/10, wrists 3/10 - depending on what I am doing.  Still taking 20 minutes walks with dog.  Home Living: lives of , assisting him due to his disability. Partially retired      Prior Function Per Pt/Caregiver Report: independent      Objective   Posture: WNL     Range of Motion: Hips, ankles, shoulder ROM WFL  Left knee 0-120 (empty end feel)  Right knee 0-133      Strength: Shoulders  Flexion 5/5  Extensions 5/5  Abduction 5-/5  Ext rotation 5-/5  Int rotation 5/5     Flexibility: NA     Palpation: tenderness of knees (left more than right), tenderness of Quads, bilateral hips, shoulders and ankles     Special Tests: NA     Gait: antalgic gait  Speed = 1.11 meter/sec     Balance: good      Stairs: reciprocal antalgic stairs navigation presented     Outcome Measures:  LEFS =47/80   5 x sit/stand = 19 seconds     PT intervention;   SciFit Bike (knee ROM) x 5 minutes  Pulleys - bilateral shoulder flexion x 2 minutes  Standing heel lifts; 3 x 10  Seated Hamstrings stretches  Repeated seated lumbar flexions, RFIS  Intro of functional strength exercises, sit/stand from chair without arms  Seated ankle and foot ROM review (referencing session #2)  Goals:  Active       PT Problem       PT Goal 1       Start:  10/17/24    Expected End:  01/14/25       Patient will be able to demonstrated and report improved functional ambulation with different daily tasks and/or recreational activities, as evident by LEFS over 49         PT Goal 2       Start:  10/17/24    Expected End:  01/14/25       Patient will report reduced/abolished pain in multiple extremity joints, indicated by grade of 0-2 on 0-10 pain scale          PT Goal 3       Start:  10/17/24    Expected End:   01/14/25       Patient will demonstrated knowledge of HEP, its maintenance frequency, and associated benefits

## 2025-01-04 NOTE — PROGRESS NOTES
"Physical Therapy    Physical Therapy Treatment    Patient Name: Frances Coughlin  MRN: [id number]  Today's Date: 1/7/2025      Time Entry:                           Visit number: Visit count could not be calculated. Make sure you are using a visit which is associated with an episode.  Insurance:  Medicare  POC: 12/9/2024-3/9/2025  Primary diagnosis:  Vertigo    Assessment:     Patient with improved gait with cues for for heel strike and walking faster.  Patient with out foot scuffing.    Plan:     Add corner quick turns and corner balance.    Current Problem  Problem List Items Addressed This Visit    None  Visit Diagnoses         Codes    Vertigo    -  Primary R42    Neck pain     M54.2    Neck stiffness     M43.6              Subjective    Patient states that she think her neck movement is better.  Patient states that she has no weird head today, but has not had to do any care for her .    Precautions     Vital Signs     Pain       Objective     Outcome Measures:     FGA:  14     Treatments:    12/20/2024:  Calf stretch 3 positions with foam roll 3 times 30\" *    Gait working on increased stride length, heel strike and pranav to normalize gait.    EVAL:  Fold overs in standing cue to use sensation of touch, keep eyes open and use touch to stop postural sway excessively     Levator scap 3 times 30\" *   Upper trap 3 times 30\" *   OP EDUCATION:       Goals:    "

## 2025-01-07 ENCOUNTER — APPOINTMENT (OUTPATIENT)
Dept: PHYSICAL THERAPY | Facility: CLINIC | Age: 75
End: 2025-01-07
Payer: MEDICARE

## 2025-01-07 DIAGNOSIS — M54.2 NECK PAIN: ICD-10-CM

## 2025-01-07 DIAGNOSIS — R42 VERTIGO: Primary | ICD-10-CM

## 2025-01-07 DIAGNOSIS — M43.6 NECK STIFFNESS: ICD-10-CM

## 2025-01-08 ENCOUNTER — APPOINTMENT (OUTPATIENT)
Dept: PHYSICAL THERAPY | Facility: CLINIC | Age: 75
End: 2025-01-08
Payer: MEDICARE

## 2025-01-14 ENCOUNTER — APPOINTMENT (OUTPATIENT)
Dept: PHYSICAL THERAPY | Facility: CLINIC | Age: 75
End: 2025-01-14
Payer: MEDICARE

## 2025-01-19 NOTE — PROGRESS NOTES
Physical Therapy    Physical Therapy Treatment    Patient Name: Frances Coughlin  MRN: [id number]  Today's Date: 1/22/2025      Time Entry:   Time Calculation  Start Time: 0900  Stop Time: 0945  Time Calculation (min): 45 min     PT Therapeutic Procedures Time Entry  Neuromuscular Re-Education Time Entry: 40                 Visit number: 3  Insurance:  Medicare  POC: 12/9/2024-3/9/2025  Primary diagnosis:  Vertigo    Assessment:     Patient with improved gait with cues for for heel strike and walking faster.  Patient with out foot scuffing.    Plan:     Add corner quick turns and corner balance.    Current Problem  Problem List Items Addressed This Visit    None  Visit Diagnoses         Codes    Vertigo    -  Primary R42    Neck pain     M54.2    Neck stiffness     M43.6                Subjective    Patient states that she continues to have dizziness with prolonged bending activities.  Patient states that she is painful all over.    Precautions     Vital Signs     Pain  Pain Assessment  Pain Assessment: 0-10  0-10 (Numeric) Pain Score: 5 - Moderate pain (6/10 with activity)    Objective     Outcome Measures:     FGA:  14     Treatments:  1/22/2025:  Forward bend with stoop maintained.  Patient observed to be holding her breath.  Patient educated and she agrees that she may have been holding her breath.  She will observe at home.  Patient educated to use talking to help with not holding her breath.    Corner turns 90* without symptoms  Corner turns with 180* turns with mild symptoms that resolved with repetitions    Corner Balance:  1/2 Tandem EO 30 seconds*  1/2 Tandem EO Horizontal head turns 10 times 1 set *   1/2 Tandem EO Vertical head turns 10 times 1 set *   1/2 Tandem EC 30 seconds *  1/2 Tandem EC  Horizontal head turns 10 times 1 set *   1/2 Tandem EC  Vertical head turns 10 times 1 set *   Cues to use pressure in feet to help decrease postural sway, and cues to move weight to toe with decreased  "sway.    12/20/2024:  Calf stretch 3 positions with foam roll 3 times 30\" *    Gait working on increased stride length, heel strike and pranav to normalize gait.    EVAL:  Fold overs in standing cue to use sensation of touch, keep eyes open and use touch to stop postural sway excessively     Levator scap 3 times 30\" *   Upper trap 3 times 30\" *   OP EDUCATION:       Goals:  Active       PT Problem       PT Goal 1:  Patient decrease DHI to 20 points for self reported improvement in function.        Start:  12/09/24    Expected End:  03/09/25            PT Goal 2:  Patient will increase cervical rotation to 0-60 degrees to allow the patient to scan environment without pain.        Start:  12/09/24    Expected End:  03/09/25            PT Goal 3:  Patient will report her dizziness at 0-3/10 at works to allow increased ease in caring for her .       Start:  12/09/24    Expected End:  03/09/25            PT Goal 4:  Will assess balance static and dynamically       Start:  12/09/24    Expected End:  03/09/25            PT Goal 5:  Patient will be independent in Home Exercise Program to manage symptoms and maximize their functional independence.        Start:  12/09/24    Expected End:  03/09/25              "

## 2025-01-22 ENCOUNTER — TREATMENT (OUTPATIENT)
Dept: PHYSICAL THERAPY | Facility: CLINIC | Age: 75
End: 2025-01-22
Payer: MEDICARE

## 2025-01-22 DIAGNOSIS — M54.2 NECK PAIN: ICD-10-CM

## 2025-01-22 DIAGNOSIS — R42 VERTIGO: Primary | ICD-10-CM

## 2025-01-22 DIAGNOSIS — M43.6 NECK STIFFNESS: ICD-10-CM

## 2025-01-22 PROCEDURE — 97112 NEUROMUSCULAR REEDUCATION: CPT | Mod: GP | Performed by: PHYSICAL THERAPIST

## 2025-01-22 ASSESSMENT — PAIN - FUNCTIONAL ASSESSMENT: PAIN_FUNCTIONAL_ASSESSMENT: 0-10

## 2025-01-22 ASSESSMENT — PAIN SCALES - GENERAL: PAINLEVEL_OUTOF10: 5 - MODERATE PAIN

## 2025-01-25 NOTE — PROGRESS NOTES
Physical Therapy    Physical Therapy Treatment    Patient Name: Frances Coughlin  MRN: [id number]  Today's Date: 1/28/2025      Time Entry:   Time Calculation  Start Time: 0845  Stop Time: 0930  Time Calculation (min): 45 min     PT Therapeutic Procedures Time Entry  Neuromuscular Re-Education Time Entry: 40                 Visit number: 4  Insurance:  Medicare  POC: 12/9/2024-3/9/2025  Primary diagnosis:  Vertigo    Assessment:     Patient with improved use of proprioception with eyes closed and decreased dizziness with quick turns with increased repetitions, cues for taking fewer steps.    Plan:     Add ambulation with head rotation and nod and possibly ambulation with 180 degree turns.    Current Problem  Problem List Items Addressed This Visit    None  Visit Diagnoses         Codes    Vertigo    -  Primary R42    Neck pain     M54.2    Neck stiffness     M43.6            Subjective    Patient states that she continues to have dizziness quick movements, such as stooping for extended time and then return to standing quickly and quick turns.    Precautions  Precautions  STEADI Fall Risk Score (The score of 4 or more indicates an increased risk of falling): 3    Vital Signs     Non taken this date    Pain     Patient with multiple areas of pain    Objective     Outcome Measures:     FGA:  14     Treatments:  1/28/2025:  Neuro 40 min  3/4 Tandem EO 30 seconds*  3/4 Tandem EO Horizontal head turns 10 times 1 set *   3/4 Tandem EO Vertical head turns 10 times 1 set *   3/4 Tandem EC 30 seconds *  3/4 Tandem EC  Horizontal head turns 10 times 1 set *   3/4 Tandem EC  Vertical head turns 10 times 1 set *     90 and 180 EO/EC      1/22/2025:  Forward bend with stoop maintained.  Patient observed to be holding her breath.  Patient educated and she agrees that she may have been holding her breath.  She will observe at home.  Patient educated to use talking to help with not holding her breath.    Corner turns 90* without  "symptoms  Corner turns with 180* turns with mild symptoms that resolved with repetitions    Corner Balance:  1/2 Tandem EO 30 seconds*  1/2 Tandem EO Horizontal head turns 10 times 1 set *   1/2 Tandem EO Vertical head turns 10 times 1 set *   1/2 Tandem EC 30 seconds *  1/2 Tandem EC  Horizontal head turns 10 times 1 set *   1/2 Tandem EC  Vertical head turns 10 times 1 set *   Cues to use pressure in feet to help decrease postural sway, and cues to move weight to toe with decreased sway.    12/20/2024:  Calf stretch 3 positions with foam roll 3 times 30\" *    Gait working on increased stride length, heel strike and pranav to normalize gait.    EVAL:  Fold overs in standing cue to use sensation of touch, keep eyes open and use touch to stop postural sway excessively     Levator scap 3 times 30\" *   Upper trap 3 times 30\" *   OP EDUCATION:       Goals:  Active       PT Problem       PT Goal 1:  Patient decrease DHI to 20 points for self reported improvement in function.        Start:  12/09/24    Expected End:  03/09/25            PT Goal 2:  Patient will increase cervical rotation to 0-60 degrees to allow the patient to scan environment without pain.        Start:  12/09/24    Expected End:  03/09/25            PT Goal 3:  Patient will report her dizziness at 0-3/10 at works to allow increased ease in caring for her .       Start:  12/09/24    Expected End:  03/09/25            PT Goal 4:  Will assess balance static and dynamically       Start:  12/09/24    Expected End:  03/09/25            PT Goal 5:  Patient will be independent in Home Exercise Program to manage symptoms and maximize their functional independence.        Start:  12/09/24    Expected End:  03/09/25                "

## 2025-01-28 ENCOUNTER — TREATMENT (OUTPATIENT)
Dept: PHYSICAL THERAPY | Facility: CLINIC | Age: 75
End: 2025-01-28
Payer: MEDICARE

## 2025-01-28 DIAGNOSIS — R42 VERTIGO: Primary | ICD-10-CM

## 2025-01-28 DIAGNOSIS — M54.2 NECK PAIN: ICD-10-CM

## 2025-01-28 DIAGNOSIS — M43.6 NECK STIFFNESS: ICD-10-CM

## 2025-01-28 PROCEDURE — 97112 NEUROMUSCULAR REEDUCATION: CPT | Mod: GP | Performed by: PHYSICAL THERAPIST

## 2025-01-29 ENCOUNTER — TREATMENT (OUTPATIENT)
Dept: PHYSICAL THERAPY | Facility: CLINIC | Age: 75
End: 2025-01-29
Payer: MEDICARE

## 2025-01-29 DIAGNOSIS — G89.29 CHRONIC PAIN OF MULTIPLE JOINTS: Primary | ICD-10-CM

## 2025-01-29 DIAGNOSIS — M43.6 NECK STIFFNESS: ICD-10-CM

## 2025-01-29 DIAGNOSIS — M15.0 PRIMARY OSTEOARTHRITIS INVOLVING MULTIPLE JOINTS: ICD-10-CM

## 2025-01-29 DIAGNOSIS — M54.2 NECK PAIN: ICD-10-CM

## 2025-01-29 DIAGNOSIS — M25.50 CHRONIC PAIN OF MULTIPLE JOINTS: Primary | ICD-10-CM

## 2025-01-29 PROCEDURE — 97110 THERAPEUTIC EXERCISES: CPT | Mod: GP

## 2025-01-29 NOTE — PROGRESS NOTES
Physical Therapy    Physical Therapy    Physical Therapy follow up    Patient Name: Frances Coughlin  MRN: [id number]  Today's Date: 1/29/2025    Time Entry:  Time Calculation  Start Time: 1650  Stop Time: 1730  Time Calculation (min): 40 min     PT Therapeutic Procedures Time Entry  Therapeutic Exercise Time Entry: 40                 Visit #6  Insurance; Medicare  Cert; 10/17/2024 - 1/14/2025  Problem List Items Addressed This Visit             ICD-10-CM       Musculoskeletal and Injuries    Chronic pain of multiple joints - Primary M25.50, G89.29    Primary osteoarthritis involving multiple joints M15.0    Neck stiffness M43.6     Other Visit Diagnoses         Codes    Neck pain     M54.2            Assessment  Patient presents with pain in multiple joints effecting quality of functional mobility and transfers from sitting/standing. Patient lacking guided home exercises regimen in order to better manage existing pains. ROM is functional in all joints. Strength is functional in all extremities, with MMT partially limited due to pain. POC discussed with patient's participation. Patient motivated to develop regimen and better manage pain. Frequency of recommended follow ups; 1w5. Patient will benefit from skilled PT for development of comprehensive HEP consisting of stretches and repeated movement exercises for shoulders, hips, knee, and ankles. OT assessment recommended if addressing small joints of hands required. Patient understands that referral from PCP or Rheumatologist would be required  12/26/2024; Patient able to reproduce all stretches, as instructed, without joint pain exacerbation. One session for final HEP remaining  1/29/2025; HEP reviewed and patient ready for DC  Plan; patient education, there ex configuration, HEP development   Progress into repeated hip motions and shoulder repeated movement therapeutic exercises   Recommended frequency; 1w5    Current Problem  1. Chronic pain of multiple joints         2. Neck stiffness        3. Primary osteoarthritis involving multiple joints        4. Neck pain            Subjective   General:   My pain is not any better, but my flexibility is much better and I find it much easier to move.   Precautions: NA    Pain: Ankles 5/10, shoulders 2/10, wrists 3/10 - depending on what I am doing.  Still taking 20 minutes walks with dog.  Home Living: lives of , assisting him due to his disability. Partially retired      Prior Function Per Pt/Caregiver Report: independent      Objective   Posture: WNL     Range of Motion: Hips, ankles, shoulder ROM WFL  Left knee 0-120 (empty end feel)  Right knee 0-133      Strength: Shoulders  Flexion 5/5  Extensions 5/5  Abduction 5-/5  Ext rotation 5-/5  Int rotation 5/5     Flexibility: NA     Palpation: tenderness of knees (left more than right), tenderness of Quads, bilateral hips, shoulders and ankles     Special Tests: NA     Gait: normal gait, improved trunk dissociation   Speed = 1.2 meter/sec     Balance: good      Stairs: reciprocal antalgic stairs navigation presented     Outcome Measures:  LEFS =47/80   5 x sit/stand = 19 seconds     PT intervention;   C-spine repeated movements in all planes  Thoracic spine repeated extensions and bilateral rotations  Wall slides  Pulleys - bilateral shoulder flexion x 2 minutes  Standing heel lifts; 3 x 10  Seated Hamstrings stretches  Isometric Quads contractions (right knee)  Repeated seated lumbar flexions, RFIS  Intro of functional strength exercises, sit/stand from chair without arms  Seated ankle and foot ROM review (referencing session #2)  Goals:  Active       PT Problem       PT Goal 1       Start:  10/17/24    Expected End:  01/14/25       Patient will be able to demonstrated and report improved functional ambulation with different daily tasks and/or recreational activities, as evident by LEFS over 49         PT Goal 2       Start:  10/17/24    Expected End:  01/14/25       Patient  will report reduced/abolished pain in multiple extremity joints, indicated by grade of 0-2 on 0-10 pain scale          PT Goal 3       Start:  10/17/24    Expected End:  01/14/25       Patient will demonstrated knowledge of HEP, its maintenance frequency, and associated benefits

## 2025-02-02 NOTE — PROGRESS NOTES
Physical Therapy    Physical Therapy Treatment    Patient Name: Frances Coughlin  MRN: [id number]  Today's Date: 2/4/2025      Time Entry:                           Visit number: Visit count could not be calculated. Make sure you are using a visit which is associated with an episode.  Insurance:  Medicare  POC: 12/9/2024-3/9/2025  Primary diagnosis:  Vertigo    Assessment:     Patient with improved use of proprioception with eyes closed and decreased dizziness with quick turns with increased repetitions, cues for taking fewer steps.    Plan:     Add ambulation with head rotation and nod and possibly ambulation with 180 degree turns.    Current Problem  Problem List Items Addressed This Visit             ICD-10-CM    Neck stiffness M43.6     Other Visit Diagnoses         Codes    Vertigo    -  Primary R42    Neck pain     M54.2              Subjective    Patient states that she continues to have dizziness quick movements, such as stooping for extended time and then return to standing quickly and quick turns.    Precautions       Vital Signs     Non taken this date    Pain     Patient with multiple areas of pain    Objective     Outcome Measures:     FGA:  14     Treatments:  1/28/2025:  Neuro 40 min  3/4 Tandem EO 30 seconds*  3/4 Tandem EO Horizontal head turns 10 times 1 set *   3/4 Tandem EO Vertical head turns 10 times 1 set *   3/4 Tandem EC 30 seconds *  3/4 Tandem EC  Horizontal head turns 10 times 1 set *   3/4 Tandem EC  Vertical head turns 10 times 1 set *     90 and 180 EO/EC      1/22/2025:  Forward bend with stoop maintained.  Patient observed to be holding her breath.  Patient educated and she agrees that she may have been holding her breath.  She will observe at home.  Patient educated to use talking to help with not holding her breath.    Corner turns 90* without symptoms  Corner turns with 180* turns with mild symptoms that resolved with repetitions    Corner Balance:  1/2 Tandem EO 30  "seconds*  1/2 Tandem EO Horizontal head turns 10 times 1 set *   1/2 Tandem EO Vertical head turns 10 times 1 set *   1/2 Tandem EC 30 seconds *  1/2 Tandem EC  Horizontal head turns 10 times 1 set *   1/2 Tandem EC  Vertical head turns 10 times 1 set *   Cues to use pressure in feet to help decrease postural sway, and cues to move weight to toe with decreased sway.    12/20/2024:  Calf stretch 3 positions with foam roll 3 times 30\" *    Gait working on increased stride length, heel strike and pranav to normalize gait.    EVAL:  Fold overs in standing cue to use sensation of touch, keep eyes open and use touch to stop postural sway excessively     Levator scap 3 times 30\" *   Upper trap 3 times 30\" *   OP EDUCATION:       Goals:        "

## 2025-02-04 ENCOUNTER — APPOINTMENT (OUTPATIENT)
Dept: PHYSICAL THERAPY | Facility: CLINIC | Age: 75
End: 2025-02-04
Payer: MEDICARE

## 2025-02-04 DIAGNOSIS — M43.6 NECK STIFFNESS: ICD-10-CM

## 2025-02-04 DIAGNOSIS — R42 VERTIGO: Primary | ICD-10-CM

## 2025-02-04 DIAGNOSIS — M54.2 NECK PAIN: ICD-10-CM

## 2025-02-09 NOTE — PROGRESS NOTES
Physical Therapy    Physical Therapy Treatment    Patient Name: Frances Coughlin  MRN: [id number]  Today's Date: 2/11/2025      Time Entry:   Time Calculation  Start Time: 0845  Stop Time: 0930  Time Calculation (min): 45 min     PT Therapeutic Procedures Time Entry  Neuromuscular Re-Education Time Entry: 40                 Visit number: 5  Insurance:  Medicare  POC: 12/9/2024-3/9/2025  Primary diagnosis:  Vertigo    Assessment:     Patient with improved use of proprioception with eyes closed and decreased dizziness with quick turns with increased repetitions, cues for taking fewer steps.    Plan:     Add ambulation with head rotation and nod and possibly ambulation with 180 degree turns.    Current Problem  Problem List Items Addressed This Visit             ICD-10-CM    Neck stiffness M43.6     Other Visit Diagnoses         Codes    Vertigo    -  Primary R42    Neck pain     M54.2                Subjective    Patient states that she continues to have dizziness quick movements, such as stooping for extended time and then return to standing quickly and quick turns.    Precautions       Vital Signs     Non taken this date    Pain     Patient with multiple areas of pain    Objective     Outcome Measures:     FGA:  14     Treatments:  2/12/2025:  Neuro 40  Ambulation with head rotation  Ambulation with head nod  Ambulation with 180 turns  Cues for using grounding to keep path and orientation to upright.    1/28/2025:  Neuro 40 min  3/4 Tandem EO 30 seconds*  3/4 Tandem EO Horizontal head turns 10 times 1 set *   3/4 Tandem EO Vertical head turns 10 times 1 set *   3/4 Tandem EC 30 seconds *  3/4 Tandem EC  Horizontal head turns 10 times 1 set *   3/4 Tandem EC  Vertical head turns 10 times 1 set *     90 and 180 EO/EC      1/22/2025:  Forward bend with stoop maintained.  Patient observed to be holding her breath.  Patient educated and she agrees that she may have been holding her breath.  She will observe at home.   "Patient educated to use talking to help with not holding her breath.    Corner turns 90* without symptoms  Corner turns with 180* turns with mild symptoms that resolved with repetitions    Corner Balance:  1/2 Tandem EO 30 seconds*  1/2 Tandem EO Horizontal head turns 10 times 1 set *   1/2 Tandem EO Vertical head turns 10 times 1 set *   1/2 Tandem EC 30 seconds *  1/2 Tandem EC  Horizontal head turns 10 times 1 set *   1/2 Tandem EC  Vertical head turns 10 times 1 set *   Cues to use pressure in feet to help decrease postural sway, and cues to move weight to toe with decreased sway.    12/20/2024:  Calf stretch 3 positions with foam roll 3 times 30\" *    Gait working on increased stride length, heel strike and pranav to normalize gait.    EVAL:  Fold overs in standing cue to use sensation of touch, keep eyes open and use touch to stop postural sway excessively     Levator scap 3 times 30\" *   Upper trap 3 times 30\" *   OP EDUCATION:       Goals:  Active       PT Problem       PT Goal 1:  Patient decrease DHI to 20 points for self reported improvement in function.        Start:  12/09/24    Expected End:  03/09/25            PT Goal 2:  Patient will increase cervical rotation to 0-60 degrees to allow the patient to scan environment without pain.        Start:  12/09/24    Expected End:  03/09/25            PT Goal 3:  Patient will report her dizziness at 0-3/10 at works to allow increased ease in caring for her .       Start:  12/09/24    Expected End:  03/09/25            PT Goal 4:  Will assess balance static and dynamically       Start:  12/09/24    Expected End:  03/09/25            PT Goal 5:  Patient will be independent in Home Exercise Program to manage symptoms and maximize their functional independence.        Start:  12/09/24    Expected End:  03/09/25                  "

## 2025-02-11 ENCOUNTER — TREATMENT (OUTPATIENT)
Dept: PHYSICAL THERAPY | Facility: CLINIC | Age: 75
End: 2025-02-11
Payer: MEDICARE

## 2025-02-11 DIAGNOSIS — R42 VERTIGO: Primary | ICD-10-CM

## 2025-02-11 DIAGNOSIS — M54.2 NECK PAIN: ICD-10-CM

## 2025-02-11 DIAGNOSIS — M43.6 NECK STIFFNESS: ICD-10-CM

## 2025-02-11 PROCEDURE — 97112 NEUROMUSCULAR REEDUCATION: CPT | Mod: GP | Performed by: PHYSICAL THERAPIST

## 2025-02-18 ENCOUNTER — APPOINTMENT (OUTPATIENT)
Dept: PHYSICAL THERAPY | Facility: CLINIC | Age: 75
End: 2025-02-18
Payer: MEDICARE

## 2025-03-07 ENCOUNTER — DOCUMENTATION (OUTPATIENT)
Dept: OPHTHALMOLOGY | Facility: CLINIC | Age: 75
End: 2025-03-07
Payer: MEDICARE

## 2025-03-16 NOTE — PROGRESS NOTES
Physical Therapy    Physical Therapy Treatment    Patient Name: Frances Coughlin  MRN: [id number]  Today's Date: 3/17/2025      Time Entry:   Time Calculation  Start Time: 1600  Stop Time: 1645  Time Calculation (min): 45 min     PT Therapeutic Procedures Time Entry  Neuromuscular Re-Education Time Entry: 40                 Visit number: 6  Insurance:  Medicare  POC: 12/9/2024-3/9/2025  Primary diagnosis:  Vertigo    Assessment:     Patient had a break in therapy due to being ill.  Patient with improved FGA but continued to be at increased fall risk, with score improving from 14 to 18.  Static balance has improved to 30 seconds in half tandem with eyes closed.  Patient with improved cervical ROM with goal met for ROM.    Plan:     Add ambulation with head rotation and nod and possibly ambulation with 180 degree turns.    Current Problem  Problem List Items Addressed This Visit             ICD-10-CM    Neck stiffness M43.6     Other Visit Diagnoses         Codes    Vertigo    -  Primary R42    Neck pain     M54.2          Subjective    Patient states that she continues to have dizziness quick movements, such as stooping for extended time and then return to standing quickly and quick turns.    Precautions  Precautions  STEADI Fall Risk Score (The score of 4 or more indicates an increased risk of falling): 3    Vital Signs     Non taken this date    Pain     Patient with multiple areas of pain    Objective     Outcome Measures:     FGA:  18      Treatments:  3/17/2025:  Ambulation with head rotation and nod without path veer  Ambulation with 180* turns  Ambulation with EC  Stepping over boxes with decrease control of M-L stability and resulting narrow MIGUE    Gait correction with cues to increase her heel strike as she is walking at her anterior limit of stability.    2/12/2025:  Neuro 40  Ambulation with head rotation  Ambulation with head nod  Ambulation with 180 turns  Cues for using grounding to keep path and  "orientation to upright.    1/28/2025:  Neuro 40 min  3/4 Tandem EO 30 seconds*  3/4 Tandem EO Horizontal head turns 10 times 1 set *   3/4 Tandem EO Vertical head turns 10 times 1 set *   3/4 Tandem EC 30 seconds *  3/4 Tandem EC  Horizontal head turns 10 times 1 set *   3/4 Tandem EC  Vertical head turns 10 times 1 set *     90 and 180 EO/EC      1/22/2025:  Forward bend with stoop maintained.  Patient observed to be holding her breath.  Patient educated and she agrees that she may have been holding her breath.  She will observe at home.  Patient educated to use talking to help with not holding her breath.    Corner turns 90* without symptoms  Corner turns with 180* turns with mild symptoms that resolved with repetitions    Corner Balance:  1/2 Tandem EO 30 seconds*  1/2 Tandem EO Horizontal head turns 10 times 1 set *   1/2 Tandem EO Vertical head turns 10 times 1 set *   1/2 Tandem EC 30 seconds *  1/2 Tandem EC  Horizontal head turns 10 times 1 set *   1/2 Tandem EC  Vertical head turns 10 times 1 set *   Cues to use pressure in feet to help decrease postural sway, and cues to move weight to toe with decreased sway.    12/20/2024:  Calf stretch 3 positions with foam roll 3 times 30\" *    Gait working on increased stride length, heel strike and pranav to normalize gait.    EVAL:  Fold overs in standing cue to use sensation of touch, keep eyes open and use touch to stop postural sway excessively     Levator scap 3 times 30\" *   Upper trap 3 times 30\" *   OP EDUCATION:       Goals:  Active       PT Problem       PT Goal 1:  Patient decrease DHI to 20 points for self reported improvement in function.        Start:  12/09/24    Expected End:  03/09/25            PT Goal 2:  Patient will increase cervical rotation to 0-60 degrees to allow the patient to scan environment without pain.  (Met)       Start:  12/09/24    Expected End:  03/09/25    Resolved:  03/17/25      Goal Note       Right 65 left 60              PT " Goal 3:  Patient will report her dizziness at 0-3/10 at works to allow increased ease in caring for her .       Start:  12/09/24    Expected End:  03/09/25         Goal Note       Currently 0/10 last week 0-7/10              PT Goal 4:  Will assess balance static and dynamically       Start:  12/09/24    Expected End:  03/09/25            PT Goal 5:  Patient will be independent in Home Exercise Program to manage symptoms and maximize their functional independence.        Start:  12/09/24    Expected End:  03/09/25

## 2025-03-17 ENCOUNTER — TREATMENT (OUTPATIENT)
Dept: PHYSICAL THERAPY | Facility: CLINIC | Age: 75
End: 2025-03-17
Payer: MEDICARE

## 2025-03-17 DIAGNOSIS — M54.2 NECK PAIN: ICD-10-CM

## 2025-03-17 DIAGNOSIS — R42 VERTIGO: Primary | ICD-10-CM

## 2025-03-17 DIAGNOSIS — M43.6 NECK STIFFNESS: ICD-10-CM

## 2025-03-17 PROCEDURE — 97112 NEUROMUSCULAR REEDUCATION: CPT | Mod: GP | Performed by: PHYSICAL THERAPIST

## 2025-03-25 ENCOUNTER — APPOINTMENT (OUTPATIENT)
Dept: PHYSICAL THERAPY | Facility: CLINIC | Age: 75
End: 2025-03-25
Payer: MEDICARE

## 2025-03-26 ENCOUNTER — APPOINTMENT (OUTPATIENT)
Dept: RHEUMATOLOGY | Facility: CLINIC | Age: 75
End: 2025-03-26
Payer: MEDICARE

## 2025-03-30 NOTE — PROGRESS NOTES
Physical Therapy    Physical Therapy Treatment    Patient Name: Frances Coughlin  MRN: [id number]  Today's Date: 3/31/2025      Time Entry:                           Visit number: Visit count could not be calculated. Make sure you are using a visit which is associated with an episode.  Insurance:  Medicare  POC: 12/9/2024-3/9/2025  Primary diagnosis:  Vertigo    Assessment:     Patient had a break in therapy due to being ill.  Patient with improved FGA but continued to be at increased fall risk, with score improving from 14 to 18.  Static balance has improved to 30 seconds in half tandem with eyes closed.  Patient with improved cervical ROM with goal met for ROM.    Plan:     Add ambulation with head rotation and nod and possibly ambulation with 180 degree turns.    Current Problem  Problem List Items Addressed This Visit             ICD-10-CM    Neck stiffness M43.6     Other Visit Diagnoses         Codes    Vertigo    -  Primary R42    Neck pain     M54.2            Subjective    Patient states that she continues to have dizziness quick movements, such as stooping for extended time and then return to standing quickly and quick turns.    Precautions       Vital Signs     Non taken this date    Pain     Patient with multiple areas of pain    Objective     Outcome Measures:     FGA:  18      Treatments:  3/17/2025:  Ambulation with head rotation and nod without path veer  Ambulation with 180* turns  Ambulation with EC  Stepping over boxes with decrease control of M-L stability and resulting narrow MIGUE    Gait correction with cues to increase her heel strike as she is walking at her anterior limit of stability.    2/12/2025:  Neuro 40  Ambulation with head rotation  Ambulation with head nod  Ambulation with 180 turns  Cues for using grounding to keep path and orientation to upright.    1/28/2025:  Neuro 40 min  3/4 Tandem EO 30 seconds*  3/4 Tandem EO Horizontal head turns 10 times 1 set *   3/4 Tandem EO Vertical  "head turns 10 times 1 set *   3/4 Tandem EC 30 seconds *  3/4 Tandem EC  Horizontal head turns 10 times 1 set *   3/4 Tandem EC  Vertical head turns 10 times 1 set *     90 and 180 EO/EC      1/22/2025:  Forward bend with stoop maintained.  Patient observed to be holding her breath.  Patient educated and she agrees that she may have been holding her breath.  She will observe at home.  Patient educated to use talking to help with not holding her breath.    Corner turns 90* without symptoms  Corner turns with 180* turns with mild symptoms that resolved with repetitions    Corner Balance:  1/2 Tandem EO 30 seconds*  1/2 Tandem EO Horizontal head turns 10 times 1 set *   1/2 Tandem EO Vertical head turns 10 times 1 set *   1/2 Tandem EC 30 seconds *  1/2 Tandem EC  Horizontal head turns 10 times 1 set *   1/2 Tandem EC  Vertical head turns 10 times 1 set *   Cues to use pressure in feet to help decrease postural sway, and cues to move weight to toe with decreased sway.    12/20/2024:  Calf stretch 3 positions with foam roll 3 times 30\" *    Gait working on increased stride length, heel strike and pranav to normalize gait.    EVAL:  Fold overs in standing cue to use sensation of touch, keep eyes open and use touch to stop postural sway excessively     Levator scap 3 times 30\" *   Upper trap 3 times 30\" *   OP EDUCATION:       Goals:            "

## 2025-03-31 ENCOUNTER — DOCUMENTATION (OUTPATIENT)
Dept: PHYSICAL THERAPY | Facility: CLINIC | Age: 75
End: 2025-03-31
Payer: MEDICARE

## 2025-03-31 ENCOUNTER — APPOINTMENT (OUTPATIENT)
Dept: PHYSICAL THERAPY | Facility: CLINIC | Age: 75
End: 2025-03-31
Payer: MEDICARE

## 2025-03-31 DIAGNOSIS — M43.6 NECK STIFFNESS: ICD-10-CM

## 2025-03-31 DIAGNOSIS — R42 VERTIGO: Primary | ICD-10-CM

## 2025-03-31 DIAGNOSIS — M54.2 NECK PAIN: ICD-10-CM

## 2025-04-01 ENCOUNTER — OFFICE VISIT (OUTPATIENT)
Dept: PRIMARY CARE | Facility: CLINIC | Age: 75
End: 2025-04-01
Payer: MEDICARE

## 2025-04-01 VITALS
BODY MASS INDEX: 28.85 KG/M2 | SYSTOLIC BLOOD PRESSURE: 151 MMHG | TEMPERATURE: 97.4 F | DIASTOLIC BLOOD PRESSURE: 84 MMHG | HEART RATE: 64 BPM | OXYGEN SATURATION: 99 % | HEIGHT: 64 IN | WEIGHT: 169 LBS | RESPIRATION RATE: 16 BRPM

## 2025-04-01 DIAGNOSIS — K30 INDIGESTION: ICD-10-CM

## 2025-04-01 DIAGNOSIS — Z00.00 ROUTINE GENERAL MEDICAL EXAMINATION AT HEALTH CARE FACILITY: Primary | ICD-10-CM

## 2025-04-01 DIAGNOSIS — Z12.11 SCREENING FOR COLON CANCER: ICD-10-CM

## 2025-04-01 DIAGNOSIS — K21.9 GASTROESOPHAGEAL REFLUX DISEASE WITHOUT ESOPHAGITIS: ICD-10-CM

## 2025-04-01 DIAGNOSIS — E55.9 VITAMIN D DEFICIENCY: ICD-10-CM

## 2025-04-01 DIAGNOSIS — E78.2 MIXED HYPERLIPIDEMIA: ICD-10-CM

## 2025-04-01 PROCEDURE — 1160F RVW MEDS BY RX/DR IN RCRD: CPT | Performed by: NURSE PRACTITIONER

## 2025-04-01 PROCEDURE — 36415 COLL VENOUS BLD VENIPUNCTURE: CPT | Performed by: NURSE PRACTITIONER

## 2025-04-01 PROCEDURE — 1125F AMNT PAIN NOTED PAIN PRSNT: CPT | Performed by: NURSE PRACTITIONER

## 2025-04-01 PROCEDURE — 1170F FXNL STATUS ASSESSED: CPT | Performed by: NURSE PRACTITIONER

## 2025-04-01 PROCEDURE — 99215 OFFICE O/P EST HI 40 MIN: CPT | Mod: 25 | Performed by: NURSE PRACTITIONER

## 2025-04-01 PROCEDURE — 3079F DIAST BP 80-89 MM HG: CPT | Performed by: NURSE PRACTITIONER

## 2025-04-01 PROCEDURE — 3077F SYST BP >= 140 MM HG: CPT | Performed by: NURSE PRACTITIONER

## 2025-04-01 PROCEDURE — 1159F MED LIST DOCD IN RCRD: CPT | Performed by: NURSE PRACTITIONER

## 2025-04-01 PROCEDURE — 3008F BODY MASS INDEX DOCD: CPT | Performed by: NURSE PRACTITIONER

## 2025-04-01 PROCEDURE — 99213 OFFICE O/P EST LOW 20 MIN: CPT | Performed by: NURSE PRACTITIONER

## 2025-04-01 RX ORDER — OMEPRAZOLE 20 MG/1
20 CAPSULE, DELAYED RELEASE ORAL DAILY
Qty: 30 CAPSULE | Refills: 5 | Status: SHIPPED | OUTPATIENT
Start: 2025-04-01 | End: 2025-09-28

## 2025-04-01 ASSESSMENT — PATIENT HEALTH QUESTIONNAIRE - PHQ9
1. LITTLE INTEREST OR PLEASURE IN DOING THINGS: NOT AT ALL
2. FEELING DOWN, DEPRESSED OR HOPELESS: NOT AT ALL
SUM OF ALL RESPONSES TO PHQ9 QUESTIONS 1 AND 2: 0

## 2025-04-01 ASSESSMENT — ACTIVITIES OF DAILY LIVING (ADL)
DOING_HOUSEWORK: INDEPENDENT
TAKING_MEDICATION: INDEPENDENT
MANAGING_FINANCES: INDEPENDENT
BATHING: INDEPENDENT
GROCERY_SHOPPING: INDEPENDENT
DRESSING: INDEPENDENT

## 2025-04-01 ASSESSMENT — ENCOUNTER SYMPTOMS
LOSS OF SENSATION IN FEET: 0
OCCASIONAL FEELINGS OF UNSTEADINESS: 0
DEPRESSION: 0

## 2025-04-01 ASSESSMENT — PAIN SCALES - GENERAL: PAINLEVEL_OUTOF10: 3

## 2025-04-01 NOTE — PATIENT INSTRUCTIONS
Thank you for coming in for your visit today!    Please follow up in 6 months or sooner if needed.    Today we completed blood work. We will contact you with any abnormalities from this testing.    For your blood pressure:  Take your medication as directed. Try to take it around the same time daily.   Keep a log of your blood pressure. Be sure to bring it with you to your next appointment so we can review it together.  Adhere to the DASH diet. This includes decreasing your salt/sodium intake. Avoid canned foods, lunch meats, and frozen foods.  Exercise for 30 minutes daily.    START omeprazole daily.  Keep a food log.    Call to schedule EGD and colonoscopy.   If needed, schedule with GI.     Continue furosemide.   Consider compression socks.     Call 911 or go to the emergency room if you have pain in your chest, difficulty breathing, or other life threatening symptoms.

## 2025-04-01 NOTE — PROGRESS NOTES
Physical Therapy                 Therapy Communication Note    Patient Name: Frances Coughlin  MRN: [id number]  Department:   Room: Room/bed info not found  Today's Date: 3/31/2025     Discipline: Physical Therapy          Missed Visit Reason:    Patient called and cancelled appointment    Missed Time: Cancel    Comment:

## 2025-04-01 NOTE — PROGRESS NOTES
"  Subjective   Reason for Visit: Frances Coughlin is an 74 y.o. female here for a Medicare Wellness visit.          Reviewed all medications by prescribing practitioner or clinical pharmacist (such as prescriptions, OTCs, herbal therapies and supplements) and documented in the medical record.    [facility]    Patient Care Team:  MEGHNA Pierson-CNP as PCP - General   Ms. Coughlin is a 73 yo F here today for annual wellness and follow up.   Notes that a year ago in [place] she fell into a garbage can and thereafter has had ongoing rib pain/ discomfort that can come and go intermittently.   Notes indigestion often, when she has never had this symptom before.  Notes pain and burning senstion in the epigastric area. Will typically arise following a meal. Does not seem to be particularly sensitive to spicy vs. bland foods and is having symptoms sporadically.  Has not had previous colonoscopy- only cologuard. She is open to screening/ EGD considering recent symptoms.  Lots of joint pain- plans to see Servando this week. She has gone through PT which helped with strength but has not helped with the pain.   She notes that she is working on the logistics to get a knee replacement.  Taking furosemide, which is helpful to manage fluid overload.   She continues with PT.       Review of Systems    Objective   Vitals:  /84   Pulse 64   Temp 36.3 °C (97.4 °F)   Resp 16   Ht 1.626 m (5' 4\")   Wt 76.7 kg (169 lb)   SpO2 99%   BMI 29.01 kg/m²       Physical Exam  Constitutional:       Appearance: Normal appearance.   Cardiovascular:      Rate and Rhythm: Normal rate and regular rhythm.   Pulmonary:      Effort: Pulmonary effort is normal. No respiratory distress.      Breath sounds: Normal breath sounds.   Skin:     General: Skin is warm and dry.   Neurological:      Mental Status: She is oriented to person, place, and time.   Psychiatric:         Mood and Affect: Mood normal.         Assessment & Plan  Routine general " medical examination at health care facility    Orders:    1 Year Follow Up In Primary Care - Wellness Exam; Future

## 2025-04-03 LAB
25(OH)D3+25(OH)D2 SERPL-MCNC: 20 NG/ML (ref 30–100)
ANION GAP SERPL CALCULATED.4IONS-SCNC: 11 MMOL/L (CALC) (ref 7–17)
BUN SERPL-MCNC: 31 MG/DL (ref 7–25)
BUN/CREAT SERPL: 34 (CALC) (ref 6–22)
CALCIUM SERPL-MCNC: 9.7 MG/DL (ref 8.6–10.4)
CHLORIDE SERPL-SCNC: 105 MMOL/L (ref 98–110)
CHOLEST SERPL-MCNC: 205 MG/DL
CHOLEST/HDLC SERPL: 4.4 (CALC)
CO2 SERPL-SCNC: 26 MMOL/L (ref 20–32)
CREAT SERPL-MCNC: 0.9 MG/DL (ref 0.6–1)
EGFRCR SERPLBLD CKD-EPI 2021: 67 ML/MIN/1.73M2
GLUCOSE SERPL-MCNC: 93 MG/DL (ref 65–99)
HDLC SERPL-MCNC: 47 MG/DL
LDLC SERPL CALC-MCNC: 133 MG/DL (CALC)
NONHDLC SERPL-MCNC: 158 MG/DL (CALC)
POTASSIUM SERPL-SCNC: 4.8 MMOL/L (ref 3.5–5.3)
SODIUM SERPL-SCNC: 142 MMOL/L (ref 135–146)
TRIGL SERPL-MCNC: 136 MG/DL

## 2025-04-03 NOTE — PROGRESS NOTES
"Subjective   Patient ID: Frances Coughlin is a 74 y.o. female who presents for No chief complaint on file..    HPI  Consult     RE \"PMR\"    I last saw her    · Polymyalgia rheumatica (725) (M35.3)   · On prednisone therapy (V58.65) (Z79.52)   · Primary osteoarthritis of first carpometacarpal joint of right hand (715.14) (M18.11)   · Knee osteoarthritis (715.36) (M17.10)      Tried PT help filterability but not pain     Tapered off pred 2022  Did well.     Joint pain ankles hips shoulder wrist hands thumbs   with dementia so did not come in  Onset 1.5 years         L knee TKR 2021    Daily joint pain  Worse with walking and Occ at night   AM gel 30 minutes     Vertigo    Worst is shoulder, ankles and hips     1 year ago flexion r 4th digit         ROS      Current Outpatient Medications   Medication Sig Dispense Refill    albuterol (Ventolin HFA) 90 mcg/actuation inhaler Inhale 2 puffs every 4 hours if needed for wheezing or shortness of breath. 8 g 5    calcium carbonate-vitamin D3 600 mg-5 mcg (200 unit) tablet TAKE 1 TABLET DAILY 90 tablet 3    fluticasone (Flonase) 50 mcg/actuation nasal spray Administer 1 spray into each nostril once daily. Shake gently. Before first use, prime pump. After use, clean tip and replace cap. 16 g 11    furosemide (Lasix) 20 mg tablet Take 1 tablet (20 mg) by mouth once daily. 90 tablet 2    lisinopriL-hydrochlorothiazide 20-25 mg tablet TAKE 1 TABLET BY MOUTH EVERY DAY---PT NEEDS APPT WITH NEW DOCTOR 90 tablet 2    meclizine (Antivert) 12.5 mg tablet Take 1 tablet (12.5 mg) by mouth 3 times a day as needed for dizziness. 30 tablet 11    omeprazole (PriLOSEC) 20 mg DR capsule Take 1 capsule (20 mg) by mouth once daily. Do not crush or chew. 30 capsule 5     No current facility-administered medications for this visit.         has a past medical history of Cervical cancer and Other specified personal risk factors, not elsewhere classified (11/12/2019).   Past " Surgical History:   Procedure Laterality Date    HYSTERECTOMY      KNEE ARTHROSCOPY W/ DEBRIDEMENT  09/22/2014    Arthroscopy Knee      Social History     Tobacco Use    Smoking status: Never    Smokeless tobacco: Never   Substance Use Topics    Alcohol use: Not Currently    Drug use: Never      family history includes Breast cancer in her maternal grandmother and sister.  Pain Management Panel           No data to display                 There were no vitals filed for this visit.    Lab Results   Component Value Date    SEDRATE 29 10/03/2024    CRP 0.51 10/03/2024       PHYSICAL EXAM      NODES   HEART  LUNGS  ABDOMEN   VASCULAR  NEURO   SKIN  JOINTS normal range of motion of both shoulders with pain  Flexion deformity of right fourth DIP  Squaring of CMC joint  Crepitus of left knee which has a total knee joint replacement  Normal range of motion of both ankles      PLAN  There are no diagnoses linked to this encounter.    I do not find any clinical manifestations based on history or physical examination of recurrent polymyalgia rheumatica.    I last saw her 3 years ago and her PMR was controlled and she weaned herself off of prednisone and has been off it now for 2 years    I believe this is osteoarthritis rather than PMR and suggest physical therapy  She takes aspirin and although I cautioned her about GI toxicity she will continue to take this since it gives her excellent relief    I briefly discussed possibly adding tramadol if the physical therapy is not helping    WSR normal  Vit d cont at 20    Plan  Trial Tramadol   Vit d 82463 week for 3 mos then q other week    See 3 mos

## 2025-04-04 ENCOUNTER — APPOINTMENT (OUTPATIENT)
Dept: RHEUMATOLOGY | Facility: CLINIC | Age: 75
End: 2025-04-04
Payer: MEDICARE

## 2025-04-04 VITALS
HEART RATE: 74 BPM | BODY MASS INDEX: 28.84 KG/M2 | WEIGHT: 168 LBS | OXYGEN SATURATION: 100 % | SYSTOLIC BLOOD PRESSURE: 144 MMHG | DIASTOLIC BLOOD PRESSURE: 78 MMHG

## 2025-04-04 DIAGNOSIS — M25.50 CHRONIC PAIN OF MULTIPLE JOINTS: ICD-10-CM

## 2025-04-04 DIAGNOSIS — M35.3 POLYMYALGIA RHEUMATICA (MULTI): ICD-10-CM

## 2025-04-04 DIAGNOSIS — G89.29 CHRONIC PAIN OF MULTIPLE JOINTS: ICD-10-CM

## 2025-04-04 DIAGNOSIS — M15.0 PRIMARY OSTEOARTHRITIS INVOLVING MULTIPLE JOINTS: Primary | ICD-10-CM

## 2025-04-04 DIAGNOSIS — E55.9 VITAMIN D DEFICIENCY: ICD-10-CM

## 2025-04-04 DIAGNOSIS — M17.0 PRIMARY OSTEOARTHRITIS OF BOTH KNEES: ICD-10-CM

## 2025-04-04 PROCEDURE — 99214 OFFICE O/P EST MOD 30 MIN: CPT | Performed by: INTERNAL MEDICINE

## 2025-04-04 PROCEDURE — 3078F DIAST BP <80 MM HG: CPT | Performed by: INTERNAL MEDICINE

## 2025-04-04 PROCEDURE — 3077F SYST BP >= 140 MM HG: CPT | Performed by: INTERNAL MEDICINE

## 2025-04-04 PROCEDURE — 1159F MED LIST DOCD IN RCRD: CPT | Performed by: INTERNAL MEDICINE

## 2025-04-04 RX ORDER — TRAMADOL HYDROCHLORIDE 50 MG/1
50 TABLET ORAL EVERY 8 HOURS PRN
Qty: 21 TABLET | Refills: 0 | Status: SHIPPED | OUTPATIENT
Start: 2025-04-04 | End: 2025-04-14

## 2025-04-04 RX ORDER — CHOLECALCIFEROL (VITAMIN D3) 1250 MCG
TABLET ORAL
Qty: 12 TABLET | Refills: 3 | Status: SHIPPED | OUTPATIENT
Start: 2025-04-04

## 2025-04-06 NOTE — PROGRESS NOTES
"Physical Therapy    Physical Therapy Treatment    Patient Name: Frances Coughlin  MRN: [id number]  Today's Date: 4/10/2025      Time Entry:   Time Calculation  Start Time: 0815  Stop Time: 0900  Time Calculation (min): 45 min     PT Therapeutic Procedures Time Entry  Neuromuscular Re-Education Time Entry: 30  Therapeutic Exercise Time Entry: 10                 Visit number: 7  Insurance:  Medicare  POC: 12/9/2024-3/9/2025  Primary diagnosis:  Vertigo    Assessment:     Patient with cues to work on keeping weight to toe with retro walking.  Patient with continued tendency to toe walk improved with stretching on stair for calf.    Plan:     Add calf raises on steps    Current Problem  Problem List Items Addressed This Visit             ICD-10-CM    Neck stiffness M43.6     Other Visit Diagnoses         Codes    Vertigo    -  Primary R42    Neck pain     M54.2              Subjective    Patient states that she is feeling that her balance is getting better.    Precautions       Vital Signs     Non taken this date    Pain     Patient with multiple areas of pain    Objective     Outcome Measures:     FGA:  18      Treatments:  4/10/2025:  Neuro 30  3/4 Tandem EO 30 seconds*  3/4 Tandem EO Horizontal head turns 10 times 1 set *   3/4 Tandem EO Vertical head turns 10 times 1 set *   3/4 Tandem EC 30 seconds *  3/4 Tandem EC  Horizontal head turns 10 times 1 set *   3/4 Tandem EC  Vertical head turns 10 times 1 set *    ambulation with head rotation and nod   ambulation with 180 degree turns  Retro ambulation cues to keep weight to toes to stop foot scuff    Therapeutic exercises: 10 min    Calf stretch on step 3 position 3 times 30\" *     3/17/2025:  Ambulation with head rotation and nod without path veer  Ambulation with 180* turns  Ambulation with EC  Stepping over boxes with decrease control of M-L stability and resulting narrow MIGUE    Gait correction with cues to increase her heel strike as she is walking at her " "anterior limit of stability.    2/12/2025:  Neuro 40  Ambulation with head rotation  Ambulation with head nod  Ambulation with 180 turns  Cues for using grounding to keep path and orientation to upright.    1/28/2025:  Neuro 40 min  3/4 Tandem EO 30 seconds*  3/4 Tandem EO Horizontal head turns 10 times 1 set *   3/4 Tandem EO Vertical head turns 10 times 1 set *   3/4 Tandem EC 30 seconds *  3/4 Tandem EC  Horizontal head turns 10 times 1 set *   3/4 Tandem EC  Vertical head turns 10 times 1 set *     90 and 180 EO/EC      1/22/2025:  Forward bend with stoop maintained.  Patient observed to be holding her breath.  Patient educated and she agrees that she may have been holding her breath.  She will observe at home.  Patient educated to use talking to help with not holding her breath.    Corner turns 90* without symptoms  Corner turns with 180* turns with mild symptoms that resolved with repetitions    Corner Balance:  1/2 Tandem EO 30 seconds*  1/2 Tandem EO Horizontal head turns 10 times 1 set *   1/2 Tandem EO Vertical head turns 10 times 1 set *   1/2 Tandem EC 30 seconds *  1/2 Tandem EC  Horizontal head turns 10 times 1 set *   1/2 Tandem EC  Vertical head turns 10 times 1 set *   Cues to use pressure in feet to help decrease postural sway, and cues to move weight to toe with decreased sway.    12/20/2024:  Calf stretch 3 positions with foam roll 3 times 30\" *    Gait working on increased stride length, heel strike and pranav to normalize gait.    EVAL:  Fold overs in standing cue to use sensation of touch, keep eyes open and use touch to stop postural sway excessively     Levator scap 3 times 30\" *   Upper trap 3 times 30\" *   OP EDUCATION:       Goals:  Active       PT Problem       PT Goal 1:  Patient decrease DHI to 20 points for self reported improvement in function.        Start:  12/09/24    Expected End:  03/09/25            PT Goal 2:  Patient will increase cervical rotation to 0-60 degrees to allow " the patient to scan environment without pain.  (Met)       Start:  12/09/24    Expected End:  03/09/25    Resolved:  03/17/25      Goal Note       Right 65 left 60              PT Goal 3:  Patient will report her dizziness at 0-3/10 at works to allow increased ease in caring for her .       Start:  12/09/24    Expected End:  03/09/25         Goal Note       Currently 0/10 last week 0-7/10              PT Goal 4:  Will assess balance static and dynamically       Start:  12/09/24    Expected End:  03/09/25            PT Goal 5:  Patient will be independent in Home Exercise Program to manage symptoms and maximize their functional independence.        Start:  12/09/24    Expected End:  03/09/25

## 2025-04-08 ENCOUNTER — APPOINTMENT (OUTPATIENT)
Dept: PRIMARY CARE | Facility: CLINIC | Age: 75
End: 2025-04-08
Payer: MEDICARE

## 2025-04-10 ENCOUNTER — TREATMENT (OUTPATIENT)
Dept: PHYSICAL THERAPY | Facility: CLINIC | Age: 75
End: 2025-04-10
Payer: MEDICARE

## 2025-04-10 DIAGNOSIS — M43.6 NECK STIFFNESS: ICD-10-CM

## 2025-04-10 DIAGNOSIS — M54.2 NECK PAIN: ICD-10-CM

## 2025-04-10 DIAGNOSIS — R42 VERTIGO: Primary | ICD-10-CM

## 2025-04-10 PROCEDURE — 97110 THERAPEUTIC EXERCISES: CPT | Mod: GP | Performed by: PHYSICAL THERAPIST

## 2025-04-10 PROCEDURE — 97112 NEUROMUSCULAR REEDUCATION: CPT | Mod: GP | Performed by: PHYSICAL THERAPIST

## 2025-04-20 NOTE — PROGRESS NOTES
"Physical Therapy    Physical Therapy Treatment    Patient Name: Frances Coughlin  MRN: [id number]  Today's Date: 4/23/2025      Time Entry:                           Visit number: Visit count could not be calculated. Make sure you are using a visit which is associated with an episode.  Insurance:  Medicare  POC: 12/9/2024-3/9/2025  Primary diagnosis:  Vertigo    Assessment:     Patient with cues to work on keeping weight to toe with retro walking.  Patient with continued tendency to toe walk improved with stretching on stair for calf.    Plan:     Add calf raises on steps    Current Problem  Problem List Items Addressed This Visit    None  Visit Diagnoses         Codes      Vertigo    -  Primary R42                Subjective    Patient states that she is feeling that her balance is getting better.    Precautions       Vital Signs     Non taken this date    Pain     Patient with multiple areas of pain    Objective     Outcome Measures:     FGA:  18      Treatments:  4/10/2025:  Neuro 30  3/4 Tandem EO 30 seconds*  3/4 Tandem EO Horizontal head turns 10 times 1 set *   3/4 Tandem EO Vertical head turns 10 times 1 set *   3/4 Tandem EC 30 seconds *  3/4 Tandem EC  Horizontal head turns 10 times 1 set *   3/4 Tandem EC  Vertical head turns 10 times 1 set *    ambulation with head rotation and nod   ambulation with 180 degree turns  Retro ambulation cues to keep weight to toes to stop foot scuff    Therapeutic exercises: 10 min    Calf stretch on step 3 position 3 times 30\" *     3/17/2025:  Ambulation with head rotation and nod without path veer  Ambulation with 180* turns  Ambulation with EC  Stepping over boxes with decrease control of M-L stability and resulting narrow MIGUE    Gait correction with cues to increase her heel strike as she is walking at her anterior limit of stability.    2/12/2025:  Neuro 40  Ambulation with head rotation  Ambulation with head nod  Ambulation with 180 turns  Cues for using grounding " "to keep path and orientation to upright.    1/28/2025:  Neuro 40 min  3/4 Tandem EO 30 seconds*  3/4 Tandem EO Horizontal head turns 10 times 1 set *   3/4 Tandem EO Vertical head turns 10 times 1 set *   3/4 Tandem EC 30 seconds *  3/4 Tandem EC  Horizontal head turns 10 times 1 set *   3/4 Tandem EC  Vertical head turns 10 times 1 set *     90 and 180 EO/EC      1/22/2025:  Forward bend with stoop maintained.  Patient observed to be holding her breath.  Patient educated and she agrees that she may have been holding her breath.  She will observe at home.  Patient educated to use talking to help with not holding her breath.    Corner turns 90* without symptoms  Corner turns with 180* turns with mild symptoms that resolved with repetitions    Corner Balance:  1/2 Tandem EO 30 seconds*  1/2 Tandem EO Horizontal head turns 10 times 1 set *   1/2 Tandem EO Vertical head turns 10 times 1 set *   1/2 Tandem EC 30 seconds *  1/2 Tandem EC  Horizontal head turns 10 times 1 set *   1/2 Tandem EC  Vertical head turns 10 times 1 set *   Cues to use pressure in feet to help decrease postural sway, and cues to move weight to toe with decreased sway.    12/20/2024:  Calf stretch 3 positions with foam roll 3 times 30\" *    Gait working on increased stride length, heel strike and pranav to normalize gait.    EVAL:  Fold overs in standing cue to use sensation of touch, keep eyes open and use touch to stop postural sway excessively     Levator scap 3 times 30\" *   Upper trap 3 times 30\" *   OP EDUCATION:       Goals:              "

## 2025-04-23 ENCOUNTER — APPOINTMENT (OUTPATIENT)
Dept: PHYSICAL THERAPY | Facility: CLINIC | Age: 75
End: 2025-04-23
Payer: MEDICARE

## 2025-04-23 DIAGNOSIS — R42 VERTIGO: Primary | ICD-10-CM

## 2025-04-30 ENCOUNTER — APPOINTMENT (OUTPATIENT)
Dept: PHYSICAL THERAPY | Facility: CLINIC | Age: 75
End: 2025-04-30
Payer: MEDICARE

## 2025-05-03 NOTE — PROGRESS NOTES
Physical Therapy    Physical Therapy Treatment    Patient Name: Frances Coughlin  MRN: [id number]  Today's Date: 5/7/2025      Time Entry:   Time Calculation  Start Time: 1350  Stop Time: 1430  Time Calculation (min): 40 min     PT Therapeutic Procedures Time Entry  Neuromuscular Re-Education Time Entry: 15  Therapeutic Exercise Time Entry: 25                 Visit number: 8  Insurance:  Medicare  POC: 12/9/2024-3/9/2025  Primary diagnosis:  Vertigo    Assessment:     Patient with cues to work on keeping weight to toe with retro walking.      Plan:     Add soleus stretch    Current Problem  Problem List Items Addressed This Visit           ICD-10-CM    Neck stiffness M43.6     Other Visit Diagnoses         Codes      Vertigo    -  Primary R42      Neck pain     M54.2                  Subjective    Patient states that she is struggling with doing HEP with     Precautions  Precautions  STEADI Fall Risk Score (The score of 4 or more indicates an increased risk of falling): 3    Vital Signs     Non taken this date    Pain  Pain Assessment  Pain Assessment: 0-10  0-10 (Numeric) Pain Score:  (ankle pain left 8/10 right 3/10)      Objective     Outcome Measures:     FGA:  18      Treatments:  5/7/2025:  Therapeutic exercises: 25 min    Calf stretch on step 3 position 4 set 30 second hold  Toe raises 10 times 1 set * cues to move through full ROM    Neuro 15  Retro walking cues for increase foot clearance and keep weight forward    4/10/2025:  Neuro 30  3/4 Tandem EO 30 seconds*  3/4 Tandem EO Horizontal head turns 10 times 1 set *   3/4 Tandem EO Vertical head turns 10 times 1 set *   3/4 Tandem EC 30 seconds *  3/4 Tandem EC  Horizontal head turns 10 times 1 set *   3/4 Tandem EC  Vertical head turns 10 times 1 set *    ambulation with head rotation and nod   ambulation with 180 degree turns  Retro ambulation cues to keep weight to toes to stop foot scuff    Therapeutic exercises: 10 min    Calf stretch on step 3  "position 3 times 30\" *     3/17/2025:  Ambulation with head rotation and nod without path veer  Ambulation with 180* turns  Ambulation with EC  Stepping over boxes with decrease control of M-L stability and resulting narrow MIGUE    Gait correction with cues to increase her heel strike as she is walking at her anterior limit of stability.    2/12/2025:  Neuro 40  Ambulation with head rotation  Ambulation with head nod  Ambulation with 180 turns  Cues for using grounding to keep path and orientation to upright.    1/28/2025:  Neuro 40 min  3/4 Tandem EO 30 seconds*  3/4 Tandem EO Horizontal head turns 10 times 1 set *   3/4 Tandem EO Vertical head turns 10 times 1 set *   3/4 Tandem EC 30 seconds *  3/4 Tandem EC  Horizontal head turns 10 times 1 set *   3/4 Tandem EC  Vertical head turns 10 times 1 set *     90 and 180 EO/EC      1/22/2025:  Forward bend with stoop maintained.  Patient observed to be holding her breath.  Patient educated and she agrees that she may have been holding her breath.  She will observe at home.  Patient educated to use talking to help with not holding her breath.    Corner turns 90* without symptoms  Corner turns with 180* turns with mild symptoms that resolved with repetitions    Corner Balance:  1/2 Tandem EO 30 seconds*  1/2 Tandem EO Horizontal head turns 10 times 1 set *   1/2 Tandem EO Vertical head turns 10 times 1 set *   1/2 Tandem EC 30 seconds *  1/2 Tandem EC  Horizontal head turns 10 times 1 set *   1/2 Tandem EC  Vertical head turns 10 times 1 set *   Cues to use pressure in feet to help decrease postural sway, and cues to move weight to toe with decreased sway.    12/20/2024:  Calf stretch 3 positions with foam roll 3 times 30\" *    Gait working on increased stride length, heel strike and pranav to normalize gait.    EVAL:  Fold overs in standing cue to use sensation of touch, keep eyes open and use touch to stop postural sway excessively     Levator scap 3 times 30\" * " "  Upper trap 3 times 30\" *   OP EDUCATION:       Goals:  Active       PT Problem       PT Goal 1:  Patient decrease DHI to 20 points for self reported improvement in function.        Start:  12/09/24    Expected End:  03/09/25            PT Goal 2:  Patient will increase cervical rotation to 0-60 degrees to allow the patient to scan environment without pain.  (Met)       Start:  12/09/24    Expected End:  03/09/25    Resolved:  03/17/25      Goal Note       Right 65 left 60              PT Goal 3:  Patient will report her dizziness at 0-3/10 at works to allow increased ease in caring for her .       Start:  12/09/24    Expected End:  03/09/25         Goal Note       Currently 0/10 last week 0-7/10              PT Goal 4:  Will assess balance static and dynamically       Start:  12/09/24    Expected End:  03/09/25            PT Goal 5:  Patient will be independent in Home Exercise Program to manage symptoms and maximize their functional independence.        Start:  12/09/24    Expected End:  03/09/25                        "

## 2025-05-07 ENCOUNTER — TREATMENT (OUTPATIENT)
Dept: PHYSICAL THERAPY | Facility: CLINIC | Age: 75
End: 2025-05-07
Payer: MEDICARE

## 2025-05-07 DIAGNOSIS — M54.2 NECK PAIN: ICD-10-CM

## 2025-05-07 DIAGNOSIS — R42 VERTIGO: Primary | ICD-10-CM

## 2025-05-07 DIAGNOSIS — M43.6 NECK STIFFNESS: ICD-10-CM

## 2025-05-07 PROCEDURE — 97112 NEUROMUSCULAR REEDUCATION: CPT | Mod: GP | Performed by: PHYSICAL THERAPIST

## 2025-05-07 PROCEDURE — 97110 THERAPEUTIC EXERCISES: CPT | Mod: GP | Performed by: PHYSICAL THERAPIST

## 2025-05-07 ASSESSMENT — PAIN - FUNCTIONAL ASSESSMENT: PAIN_FUNCTIONAL_ASSESSMENT: 0-10

## 2025-05-10 NOTE — PROGRESS NOTES
"Physical Therapy    Physical Therapy Treatment    Patient Name: Frances Coughlin  MRN: [id number]  Today's Date: 5/12/2025      Time Entry:   Time Calculation  Start Time: 0945  Stop Time: 1030  Time Calculation (min): 45 min     PT Therapeutic Procedures Time Entry  Therapeutic Exercise Time Entry: 40                 Visit number: 9  Insurance:  Medicare  POC: 12/9/2024-3/9/2025  Primary diagnosis:  Vertigo    Assessment:     Patient cues that she tends to keep weight shifted to her toes     Plan:     REASSESSMENT, trial rocker board to increase awareness of COG over MIGUE.    Current Problem  Problem List Items Addressed This Visit           ICD-10-CM    Neck stiffness M43.6     Other Visit Diagnoses         Codes      Vertigo    -  Primary R42      Neck pain     M54.2          Subjective    Patient states that she is struggling with doing HEP with     Precautions       Vital Signs     Non taken this date    Pain         Objective     Outcome Measures:     FGA:  18      Treatments:  5/14/2025:  HEP modified and thinned to essential exercises to allow for travel by car.  Soleus stretch at wall 3 times 30\" *  Supine quad stretch 3 times 30\" *     5/7/2025:  Therapeutic exercises: 25 min    Calf stretch on step 3 position 4 set 30 second hold  Toe raises 10 times 1 set * cues to move through full ROM    Neuro 15  Retro walking cues for increase foot clearance and keep weight forward    4/10/2025:  Neuro 30  3/4 Tandem EO 30 seconds*  3/4 Tandem EO Horizontal head turns 10 times 1 set *   3/4 Tandem EO Vertical head turns 10 times 1 set *   3/4 Tandem EC 30 seconds *  3/4 Tandem EC  Horizontal head turns 10 times 1 set *   3/4 Tandem EC  Vertical head turns 10 times 1 set *    ambulation with head rotation and nod   ambulation with 180 degree turns  Retro ambulation cues to keep weight to toes to stop foot scuff    Therapeutic exercises: 10 min    Calf stretch on step 3 position 3 times 30\" * " "    3/17/2025:  Ambulation with head rotation and nod without path veer  Ambulation with 180* turns  Ambulation with EC  Stepping over boxes with decrease control of M-L stability and resulting narrow MIGUE    Gait correction with cues to increase her heel strike as she is walking at her anterior limit of stability.    2/12/2025:  Neuro 40  Ambulation with head rotation  Ambulation with head nod  Ambulation with 180 turns  Cues for using grounding to keep path and orientation to upright.    1/28/2025:  Neuro 40 min  3/4 Tandem EO 30 seconds*  3/4 Tandem EO Horizontal head turns 10 times 1 set *   3/4 Tandem EO Vertical head turns 10 times 1 set *   3/4 Tandem EC 30 seconds *  3/4 Tandem EC  Horizontal head turns 10 times 1 set *   3/4 Tandem EC  Vertical head turns 10 times 1 set *     90 and 180 EO/EC      1/22/2025:  Forward bend with stoop maintained.  Patient observed to be holding her breath.  Patient educated and she agrees that she may have been holding her breath.  She will observe at home.  Patient educated to use talking to help with not holding her breath.    Corner turns 90* without symptoms  Corner turns with 180* turns with mild symptoms that resolved with repetitions    Corner Balance:  1/2 Tandem EO 30 seconds*  1/2 Tandem EO Horizontal head turns 10 times 1 set *   1/2 Tandem EO Vertical head turns 10 times 1 set *   1/2 Tandem EC 30 seconds *  1/2 Tandem EC  Horizontal head turns 10 times 1 set *   1/2 Tandem EC  Vertical head turns 10 times 1 set *   Cues to use pressure in feet to help decrease postural sway, and cues to move weight to toe with decreased sway.    12/20/2024:  Calf stretch 3 positions with foam roll 3 times 30\" *    Gait working on increased stride length, heel strike and pranav to normalize gait.    EVAL:  Fold overs in standing cue to use sensation of touch, keep eyes open and use touch to stop postural sway excessively     Levator scap 3 times 30\" *   Upper trap 3 times 30\" * "   OP EDUCATION:       Goals:  Active       PT Problem       PT Goal 1:  Patient decrease DHI to 20 points for self reported improvement in function.        Start:  12/09/24    Expected End:  03/09/25            PT Goal 2:  Patient will increase cervical rotation to 0-60 degrees to allow the patient to scan environment without pain.  (Met)       Start:  12/09/24    Expected End:  03/09/25    Resolved:  03/17/25      Goal Note       Right 65 left 60              PT Goal 3:  Patient will report her dizziness at 0-3/10 at works to allow increased ease in caring for her .       Start:  12/09/24    Expected End:  03/09/25         Goal Note       Currently 0/10 last week 0-7/10              PT Goal 4:  Will assess balance static and dynamically       Start:  12/09/24    Expected End:  03/09/25            PT Goal 5:  Patient will be independent in Home Exercise Program to manage symptoms and maximize their functional independence.        Start:  12/09/24    Expected End:  03/09/25

## 2025-05-12 ENCOUNTER — TREATMENT (OUTPATIENT)
Dept: PHYSICAL THERAPY | Facility: CLINIC | Age: 75
End: 2025-05-12
Payer: MEDICARE

## 2025-05-12 DIAGNOSIS — M43.6 NECK STIFFNESS: ICD-10-CM

## 2025-05-12 DIAGNOSIS — M54.2 NECK PAIN: ICD-10-CM

## 2025-05-12 DIAGNOSIS — R42 VERTIGO: Primary | ICD-10-CM

## 2025-05-12 PROCEDURE — 97110 THERAPEUTIC EXERCISES: CPT | Mod: GP | Performed by: PHYSICAL THERAPIST

## 2025-05-12 ASSESSMENT — PAIN - FUNCTIONAL ASSESSMENT: PAIN_FUNCTIONAL_ASSESSMENT: 0-10

## 2025-05-14 ENCOUNTER — HOSPITAL ENCOUNTER (EMERGENCY)
Facility: HOSPITAL | Age: 75
Discharge: HOME | End: 2025-05-15
Payer: MEDICARE

## 2025-05-14 VITALS
WEIGHT: 165 LBS | HEART RATE: 75 BPM | RESPIRATION RATE: 18 BRPM | TEMPERATURE: 98.4 F | SYSTOLIC BLOOD PRESSURE: 115 MMHG | HEIGHT: 63 IN | BODY MASS INDEX: 29.23 KG/M2 | OXYGEN SATURATION: 98 % | DIASTOLIC BLOOD PRESSURE: 59 MMHG
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
## 2025-05-14 DIAGNOSIS — S61.012A LACERATION OF LEFT THUMB WITHOUT FOREIGN BODY WITHOUT DAMAGE TO NAIL, INITIAL ENCOUNTER: Primary | ICD-10-CM

## 2025-05-14 PROCEDURE — 99282 EMERGENCY DEPT VISIT SF MDM: CPT

## 2025-05-14 ASSESSMENT — COLUMBIA-SUICIDE SEVERITY RATING SCALE - C-SSRS
2. HAVE YOU ACTUALLY HAD ANY THOUGHTS OF KILLING YOURSELF?: NO
6. HAVE YOU EVER DONE ANYTHING, STARTED TO DO ANYTHING, OR PREPARED TO DO ANYTHING TO END YOUR LIFE?: NO
1. IN THE PAST MONTH, HAVE YOU WISHED YOU WERE DEAD OR WISHED YOU COULD GO TO SLEEP AND NOT WAKE UP?: NO

## 2025-05-14 ASSESSMENT — PAIN DESCRIPTION - LOCATION: LOCATION: HAND

## 2025-05-14 ASSESSMENT — PAIN SCALES - GENERAL: PAINLEVEL_OUTOF10: 4

## 2025-05-14 ASSESSMENT — PAIN - FUNCTIONAL ASSESSMENT: PAIN_FUNCTIONAL_ASSESSMENT: 0-10

## 2025-05-15 PROCEDURE — 12001 RPR S/N/AX/GEN/TRNK 2.5CM/<: CPT | Performed by: PHYSICIAN ASSISTANT

## 2025-05-15 NOTE — DISCHARGE INSTRUCTIONS
Please keep wound clean dry and covered for the next 24 hours.  After 24 hours, remove bandage, wash with gentle soap and water and pat dry.  Return to ER for any new or worsening symptoms.

## 2025-05-15 NOTE — ED TRIAGE NOTES
Pt states that she was cleaning her knifes at home and the knife fell on to her left thumb. She has a small laceration, bleeding controlled at triage not requiring dressing at this time.

## 2025-05-15 NOTE — ED PROVIDER NOTES
Chief Complaint   Patient presents with    Laceration     HPI:   Francse Coughlin is a left hand dominant 75 y.o. female with history of HTN, GERD who presents to the ED with family for evaluation of left thumb laceration.  Patient was cleaning her kitchen knives and one of them slipped from her hand and caused superficial laceration to the left thumb.  She is not on anticoagulants.  Denies any numbness, tingling, extremity weakness.  Tetanus updated 6 years ago.  Denies any other injuries.    Physical Exam  Vitals and nursing note reviewed.   Constitutional:       General: She is not in acute distress.     Appearance: Normal appearance. She is not ill-appearing or toxic-appearing.   Eyes:      Pupils: Pupils are equal, round, and reactive to light.   Cardiovascular:      Rate and Rhythm: Normal rate and regular rhythm.      Pulses: Normal pulses.      Heart sounds: Normal heart sounds.   Pulmonary:      Effort: Pulmonary effort is normal. No respiratory distress.      Breath sounds: Normal breath sounds.   Musculoskeletal:         General: Signs of injury present. Normal range of motion.        Hands:    Skin:     General: Skin is warm and dry.      Capillary Refill: Capillary refill takes less than 2 seconds.      Comments: Roughly 3 mm superficial laceration ulnar aspect of distal left thumb.  Not into subcutaneous tissue.  No active bleeding.   Neurological:      Mental Status: She is alert.      Cranial Nerves: No cranial nerve deficit.      Sensory: No sensory deficit.     VS: As documented in the triage note and EMR flowsheet from this visit were reviewed.      Medical Decision Making:   ED Course as of 05/15/25 0056   u May 15, 2025   0016 Vitals Reviewed: Afebrile. Normotensive. Not tachycardic nor tachypneic. No hypoxia.   [KA]   0016 Patient is a 75-year-old female that presents to the ED for evaluation of left thumb laceration.  On exam she has small superficial laceration of the left thumb.  It  is not into subcutaneous tissue.  Bleeding is controlled.  No indication for suture repair.  Wound was cleaned and dressed with glue, Steri-Strip and Band-Aid.  Discussed wound care instructions.  Patient was able to verbalize understanding of wound care instructions using teach back method. [KA]      ED Course User Index  [KA] Tahmina Lewis PA-C         Diagnoses as of 05/15/25 0056   Laceration of left thumb without foreign body without damage to nail, initial encounter   Injury due to knife     Laceration Repair    Performed by: Tahmina Lewis PA-C  Authorized by: Tahmina Lewis PA-C    Consent:     Consent obtained:  Verbal    Consent given by:  Patient    Risks, benefits, and alternatives were discussed: yes      Risks discussed:  Infection, pain, poor cosmetic result and poor wound healing    Alternatives discussed:  No treatment  Universal protocol:     Procedure explained and questions answered to patient or proxy's satisfaction: yes      Immediately prior to procedure, a time out was called: yes      Patient identity confirmed:  Verbally with patient  Anesthesia:     Anesthesia method:  None  Laceration details:     Location:  Finger    Finger location:  L thumb    Length (cm):  0.3  Treatment:     Area cleansed with:  Soap and water and saline  Skin repair:     Repair method:  Tissue adhesive and Steri-Strips    Number of Steri-Strips:  1  Approximation:     Approximation:  Close  Repair type:     Repair type:  Simple  Post-procedure details:     Dressing:  Adhesive bandage    Procedure completion:  Tolerated well, no immediate complications     Escalation of Care: Appropriate for outpatient manage     Counseling: Spoke with the patient and discussed today´s findings, in addition to providing specific details for the plan of care and expected course.  Patient was given the opportunity to ask questions.    Discussed return precautions and importance of follow-up.  Advised to follow-up with  PCP.  Advised to return to the ED for changing or worsening symptoms, new symptoms, complaint specific precautions, and precautions listed on the discharge paperwork.  Educated on the common potential side effects of medications prescribed.    I advised the patient that the emergency evaluation and treatment provided today doesn't end their need for medical care. It is very important that they follow-up with their primary care provider or other specialist as instructed.    The plan of care was mutually agreed upon with the patient. The patient and/or family were given the opportunity to ask questions. All questions asked today in the ED were answered to the best of my ability with today's information.    I specifically advised the patient to return to the ED for changing or worsening symptoms, worrisome new symptoms, or for any complaint specific precautions listed on the discharge paperwork.    This patient was cared for in the setting of nationwide stress on resources and staffing.    This report was transcribed using voice recognition software.  Every effort was made to ensure accuracy, however, inadvertently computerized transcription errors may be present.       Tahmina Lewis PA-C  05/15/25 0056

## 2025-06-04 ENCOUNTER — OFFICE VISIT (OUTPATIENT)
Dept: RHEUMATOLOGY | Facility: CLINIC | Age: 75
End: 2025-06-04
Payer: MEDICARE

## 2025-06-04 VITALS
HEART RATE: 73 BPM | OXYGEN SATURATION: 97 % | SYSTOLIC BLOOD PRESSURE: 104 MMHG | WEIGHT: 164 LBS | DIASTOLIC BLOOD PRESSURE: 62 MMHG | BODY MASS INDEX: 29.05 KG/M2

## 2025-06-04 DIAGNOSIS — M25.50 CHRONIC PAIN OF MULTIPLE JOINTS: Primary | ICD-10-CM

## 2025-06-04 DIAGNOSIS — M15.0 PRIMARY OSTEOARTHRITIS INVOLVING MULTIPLE JOINTS: ICD-10-CM

## 2025-06-04 DIAGNOSIS — G89.29 CHRONIC PAIN OF MULTIPLE JOINTS: Primary | ICD-10-CM

## 2025-06-04 PROCEDURE — 3078F DIAST BP <80 MM HG: CPT | Performed by: INTERNAL MEDICINE

## 2025-06-04 PROCEDURE — 1159F MED LIST DOCD IN RCRD: CPT | Performed by: INTERNAL MEDICINE

## 2025-06-04 PROCEDURE — 3074F SYST BP LT 130 MM HG: CPT | Performed by: INTERNAL MEDICINE

## 2025-06-04 PROCEDURE — 99214 OFFICE O/P EST MOD 30 MIN: CPT | Performed by: INTERNAL MEDICINE

## 2025-06-04 RX ORDER — TRAMADOL HYDROCHLORIDE 50 MG/1
50 TABLET, FILM COATED ORAL 2 TIMES DAILY
Refills: 0 | Status: CANCELLED | OUTPATIENT
Start: 2025-06-04

## 2025-06-04 NOTE — PROGRESS NOTES
"Subjective   Patient ID: Frances Coughlin is a 75 y.o. female who presents for Follow-up (FUV- patient would like to discuss treatment options for Osteoarthritis. Patient had a trail of Tramadol but would like to see if there may be better options. ).    HPI  Last seen 4-    In [place] for 2 weeks   Tramadol helped     ----------------------------------------------------------------------  RE \"PMR\"    I last saw her    · Polymyalgia rheumatica (725) (M35.3)   · On prednisone therapy (V58.65) (Z79.52)   · Primary osteoarthritis of first carpometacarpal joint of right hand (715.14) (M18.11)   · Knee osteoarthritis (715.36) (M17.10)      Tried PT help filterability but not pain     Tapered off pred 2022  Did well.     Joint pain ankles hips shoulder wrist hands thumbs   with dementia so did not come in  Onset 1.5 years         L knee TKR 2021    Daily joint pain  Worse with walking and Occ at night   AM gel 30 minutes     Vertigo    Worst is shoulder, ankles and hips     1 year ago flexion r 4th digit         ROS      Current Outpatient Medications   Medication Sig Dispense Refill    calcium carbonate-vitamin D3 600 mg-5 mcg (200 unit) tablet TAKE 1 TABLET DAILY 90 tablet 3    cholecalciferol (Vitamin D3) 1,250 mcg (50,000 unit) tablet Take once a week for 3 mos then q other week thereafter 12 tablet 3    fluticasone (Flonase) 50 mcg/actuation nasal spray Administer 1 spray into each nostril once daily. Shake gently. Before first use, prime pump. After use, clean tip and replace cap. 16 g 11    furosemide (Lasix) 20 mg tablet Take 1 tablet (20 mg) by mouth once daily. 90 tablet 2    lisinopriL-hydrochlorothiazide 20-25 mg tablet TAKE 1 TABLET BY MOUTH EVERY DAY---PT NEEDS APPT WITH NEW DOCTOR 90 tablet 2    omeprazole (PriLOSEC) 20 mg DR capsule Take 1 capsule (20 mg) by mouth once daily. Do not crush or chew. 30 capsule 5     No current facility-administered medications for this visit.    "      has a past medical history of Cervical cancer and Other specified personal risk factors, not elsewhere classified (11/12/2019).   Past Surgical History:   Procedure Laterality Date    HYSTERECTOMY      KNEE ARTHROSCOPY W/ DEBRIDEMENT  09/22/2014    Arthroscopy Knee      Social History     Tobacco Use    Smoking status: Never    Smokeless tobacco: Never   Substance Use Topics    Alcohol use: Not Currently    Drug use: Never      family history includes Breast cancer in her maternal grandmother and sister.  Pain Management Panel           No data to display                 Vitals:    06/04/25 1547   BP: 104/62   Pulse: 73   SpO2: 97%       Lab Results   Component Value Date    SEDRATE 29 10/03/2024    CRP 0.51 10/03/2024       PHYSICAL EXAM      NODES   HEART  LUNGS  ABDOMEN   VASCULAR  NEURO   SKIN  JOINTS normal range of motion of both shoulders with pain  Flexion deformity of right fourth DIP  Squaring of CMC joint  Crepitus of left knee which has a total knee joint replacement  Normal range of motion of both ankles      PLAN  There are no diagnoses linked to this encounter.    I do not find any clinical manifestations based on history or physical examination of recurrent polymyalgia rheumatica.    I last saw her 3 years ago and her PMR was controlled and she weaned herself off of prednisone and has been off it now for 2 years    I believe this is osteoarthritis rather than PMR and suggest physical therapy  She takes aspirin and although I cautioned her about GI toxicity she will continue to take this since it gives her excellent relief    I briefly discussed possibly adding tramadol if the physical therapy is not helping    WSR normal  Vit d cont at 20    Plan  Trial Tramadol BID helped  Will continue 1 mos  Contract signed  Urine Tox ordered  See 3 mos   Vit d [id number] week for 3 mos then q other week    See 3 mos

## 2025-06-11 LAB
AMPHETAMINES UR QL: NEGATIVE NG/ML
BARBITURATES UR QL: NEGATIVE NG/ML
BENZODIAZ UR QL: NEGATIVE NG/ML
BZE UR QL: NEGATIVE NG/ML
CREAT UR-MCNC: 86.4 MG/DL
FENTANYL UR QL SCN: NEGATIVE NG/ML
METHADONE UR QL: NEGATIVE NG/ML
OPIATES UR QL: NEGATIVE NG/ML
OXIDANTS UR QL: NEGATIVE MCG/ML
OXYCODONE UR QL: NEGATIVE NG/ML
PCP UR QL: NEGATIVE NG/ML
PH UR: 5.2 [PH] (ref 4.5–9)
QUEST NOTES AND COMMENTS: NORMAL
THC UR QL: NEGATIVE NG/ML

## 2025-06-12 ENCOUNTER — TELEPHONE (OUTPATIENT)
Dept: RHEUMATOLOGY | Facility: CLINIC | Age: 75
End: 2025-06-12
Payer: MEDICARE

## 2025-06-12 DIAGNOSIS — M17.0 PRIMARY OSTEOARTHRITIS OF BOTH KNEES: ICD-10-CM

## 2025-06-12 DIAGNOSIS — M15.0 PRIMARY OSTEOARTHRITIS INVOLVING MULTIPLE JOINTS: ICD-10-CM

## 2025-06-12 DIAGNOSIS — G89.29 CHRONIC PAIN OF MULTIPLE JOINTS: Primary | ICD-10-CM

## 2025-06-12 DIAGNOSIS — M25.50 CHRONIC PAIN OF MULTIPLE JOINTS: Primary | ICD-10-CM

## 2025-06-12 DIAGNOSIS — E55.9 VITAMIN D DEFICIENCY: ICD-10-CM

## 2025-06-12 NOTE — TELEPHONE ENCOUNTER
Patient states her Tramadol was supposed to be filled at her last appt on 25. I'm not able to pend the script, trevin to it being .     PLEASE SEND TO LOCAL [facility] ON FILE.  Prescription Request for Controlled Substance Tramadol        Has The Patient Been Identified By Name And Date Of Birth: yes    RX Requestor: patient    Date of Last Refill: 25    Date of Last Controlled Substance Agreement Signed: 25    Date of Last Drug Screen: 6/10/25    Date Of Last Office Visit: 25    Date Of Future Office Visit: 9/3/25

## 2025-06-16 DIAGNOSIS — M25.50 CHRONIC PAIN OF MULTIPLE JOINTS: ICD-10-CM

## 2025-06-16 DIAGNOSIS — G89.29 CHRONIC PAIN OF MULTIPLE JOINTS: Primary | ICD-10-CM

## 2025-06-16 DIAGNOSIS — G89.29 CHRONIC PAIN OF MULTIPLE JOINTS: ICD-10-CM

## 2025-06-16 DIAGNOSIS — M17.0 PRIMARY OSTEOARTHRITIS OF BOTH KNEES: ICD-10-CM

## 2025-06-16 DIAGNOSIS — M15.0 PRIMARY OSTEOARTHRITIS INVOLVING MULTIPLE JOINTS: ICD-10-CM

## 2025-06-16 DIAGNOSIS — M15.0 PRIMARY OSTEOARTHRITIS INVOLVING MULTIPLE JOINTS: Primary | ICD-10-CM

## 2025-06-16 DIAGNOSIS — M25.50 CHRONIC PAIN OF MULTIPLE JOINTS: Primary | ICD-10-CM

## 2025-06-16 RX ORDER — TRAMADOL HYDROCHLORIDE 50 MG/1
50 TABLET, FILM COATED ORAL 2 TIMES DAILY PRN
Qty: 60 TABLET | Refills: 2 | Status: SHIPPED | OUTPATIENT
Start: 2025-06-16

## 2025-07-01 DIAGNOSIS — I10 ESSENTIAL (PRIMARY) HYPERTENSION: ICD-10-CM

## 2025-07-01 DIAGNOSIS — R60.0 LOWER LEG EDEMA: ICD-10-CM

## 2025-07-01 RX ORDER — FUROSEMIDE 20 MG/1
20 TABLET ORAL DAILY
Qty: 90 TABLET | Refills: 0 | Status: SHIPPED | OUTPATIENT
Start: 2025-07-01

## 2025-07-01 RX ORDER — LISINOPRIL AND HYDROCHLOROTHIAZIDE 20; 25 MG/1; MG/1
TABLET ORAL
Qty: 90 TABLET | Refills: 0 | Status: SHIPPED | OUTPATIENT
Start: 2025-07-01

## 2025-07-29 DIAGNOSIS — K21.9 GASTROESOPHAGEAL REFLUX DISEASE WITHOUT ESOPHAGITIS: ICD-10-CM

## 2025-07-29 RX ORDER — OMEPRAZOLE 20 MG/1
20 CAPSULE, DELAYED RELEASE ORAL DAILY
Qty: 90 CAPSULE | Refills: 1 | Status: SHIPPED | OUTPATIENT
Start: 2025-07-29 | End: 2026-01-25

## 2025-08-12 ENCOUNTER — TELEPHONE (OUTPATIENT)
Dept: GASTROENTEROLOGY | Facility: HOSPITAL | Age: 75
End: 2025-08-12
Payer: MEDICARE

## 2025-08-12 DIAGNOSIS — Z12.11 COLON CANCER SCREENING: Primary | ICD-10-CM

## 2025-08-12 RX ORDER — SODIUM, POTASSIUM,MAG SULFATES 17.5-3.13G
SOLUTION, RECONSTITUTED, ORAL ORAL
Qty: 1 EACH | Refills: 0 | Status: SHIPPED | OUTPATIENT
Start: 2025-08-12

## 2025-08-14 ENCOUNTER — APPOINTMENT (OUTPATIENT)
Dept: PRIMARY CARE | Facility: CLINIC | Age: 75
End: 2025-08-14
Payer: MEDICARE

## 2025-08-26 ENCOUNTER — APPOINTMENT (OUTPATIENT)
Dept: PRIMARY CARE | Facility: CLINIC | Age: 75
End: 2025-08-26
Payer: MEDICARE

## 2025-08-26 VITALS
WEIGHT: 165 LBS | HEIGHT: 63 IN | BODY MASS INDEX: 29.23 KG/M2 | DIASTOLIC BLOOD PRESSURE: 72 MMHG | SYSTOLIC BLOOD PRESSURE: 123 MMHG | HEART RATE: 71 BPM | OXYGEN SATURATION: 99 % | TEMPERATURE: 97.7 F

## 2025-08-26 DIAGNOSIS — M15.0 PRIMARY OSTEOARTHRITIS INVOLVING MULTIPLE JOINTS: ICD-10-CM

## 2025-08-26 DIAGNOSIS — U07.1 COVID-19: ICD-10-CM

## 2025-08-26 DIAGNOSIS — R09.81 SINUS CONGESTION: ICD-10-CM

## 2025-08-26 DIAGNOSIS — H52.4 MYOPIA OF RIGHT EYE WITH ASTIGMATISM AND PRESBYOPIA: ICD-10-CM

## 2025-08-26 DIAGNOSIS — Z96.1 PSEUDOPHAKIA OF BOTH EYES: ICD-10-CM

## 2025-08-26 DIAGNOSIS — M85.80 OSTEOPENIA, UNSPECIFIED LOCATION: ICD-10-CM

## 2025-08-26 DIAGNOSIS — K21.9 GASTROESOPHAGEAL REFLUX DISEASE WITHOUT ESOPHAGITIS: ICD-10-CM

## 2025-08-26 DIAGNOSIS — H04.123 DRY EYE SYNDROME OF BOTH EYES: ICD-10-CM

## 2025-08-26 DIAGNOSIS — H40.003 GLAUCOMA SUSPECT OF BOTH EYES: ICD-10-CM

## 2025-08-26 DIAGNOSIS — H52.11 MYOPIA OF RIGHT EYE WITH ASTIGMATISM AND PRESBYOPIA: ICD-10-CM

## 2025-08-26 DIAGNOSIS — H52.201 MYOPIA OF RIGHT EYE WITH ASTIGMATISM AND PRESBYOPIA: ICD-10-CM

## 2025-08-26 DIAGNOSIS — Z00.00 ROUTINE GENERAL MEDICAL EXAMINATION AT A HEALTH CARE FACILITY: ICD-10-CM

## 2025-08-26 DIAGNOSIS — M85.80 OTHER SPECIFIED DISORDERS OF BONE DENSITY AND STRUCTURE, UNSPECIFIED SITE: ICD-10-CM

## 2025-08-26 DIAGNOSIS — M35.3 POLYMYALGIA RHEUMATICA (MULTI): ICD-10-CM

## 2025-08-26 DIAGNOSIS — H81.13 BENIGN PAROXYSMAL POSITIONAL VERTIGO DUE TO BILATERAL VESTIBULAR DISORDER: ICD-10-CM

## 2025-08-26 DIAGNOSIS — I10 ESSENTIAL (PRIMARY) HYPERTENSION: Primary | ICD-10-CM

## 2025-08-26 DIAGNOSIS — Z12.31 ENCOUNTER FOR SCREENING MAMMOGRAM FOR BREAST CANCER: ICD-10-CM

## 2025-08-26 DIAGNOSIS — R60.0 LOWER LEG EDEMA: ICD-10-CM

## 2025-08-26 PROBLEM — M43.6 NECK STIFFNESS: Status: RESOLVED | Noted: 2025-01-29 | Resolved: 2025-08-26

## 2025-08-26 PROBLEM — M17.9 KNEE OSTEOARTHRITIS: Status: RESOLVED | Noted: 2024-09-12 | Resolved: 2025-08-26

## 2025-08-26 PROBLEM — G89.29 CHRONIC PAIN OF MULTIPLE JOINTS: Status: RESOLVED | Noted: 2024-10-17 | Resolved: 2025-08-26

## 2025-08-26 PROBLEM — H61.21 IMPACTED CERUMEN OF RIGHT EAR: Status: RESOLVED | Noted: 2024-11-19 | Resolved: 2025-08-26

## 2025-08-26 PROBLEM — M25.50 CHRONIC PAIN OF MULTIPLE JOINTS: Status: RESOLVED | Noted: 2024-10-17 | Resolved: 2025-08-26

## 2025-08-26 PROBLEM — R51.9 HEADACHE: Status: RESOLVED | Noted: 2024-09-12 | Resolved: 2025-08-26

## 2025-08-26 PROBLEM — R53.83 FATIGUE: Status: RESOLVED | Noted: 2024-09-12 | Resolved: 2025-08-26

## 2025-08-26 PROCEDURE — 3074F SYST BP LT 130 MM HG: CPT | Performed by: STUDENT IN AN ORGANIZED HEALTH CARE EDUCATION/TRAINING PROGRAM

## 2025-08-26 PROCEDURE — 3078F DIAST BP <80 MM HG: CPT | Performed by: STUDENT IN AN ORGANIZED HEALTH CARE EDUCATION/TRAINING PROGRAM

## 2025-08-26 PROCEDURE — 1159F MED LIST DOCD IN RCRD: CPT | Performed by: STUDENT IN AN ORGANIZED HEALTH CARE EDUCATION/TRAINING PROGRAM

## 2025-08-26 PROCEDURE — 1036F TOBACCO NON-USER: CPT | Performed by: STUDENT IN AN ORGANIZED HEALTH CARE EDUCATION/TRAINING PROGRAM

## 2025-08-26 PROCEDURE — 99214 OFFICE O/P EST MOD 30 MIN: CPT | Performed by: STUDENT IN AN ORGANIZED HEALTH CARE EDUCATION/TRAINING PROGRAM

## 2025-08-26 PROCEDURE — 1160F RVW MEDS BY RX/DR IN RCRD: CPT | Performed by: STUDENT IN AN ORGANIZED HEALTH CARE EDUCATION/TRAINING PROGRAM

## 2025-08-26 RX ORDER — FLUTICASONE PROPIONATE 50 MCG
1 SPRAY, SUSPENSION (ML) NASAL DAILY
Qty: 16 G | Refills: 11 | Status: SHIPPED | OUTPATIENT
Start: 2025-08-26 | End: 2026-08-26

## 2025-08-26 RX ORDER — LISINOPRIL AND HYDROCHLOROTHIAZIDE 20; 25 MG/1; MG/1
1 TABLET ORAL DAILY
Qty: 90 TABLET | Refills: 3 | Status: SHIPPED | OUTPATIENT
Start: 2025-08-26 | End: 2025-08-26 | Stop reason: DRUGHIGH

## 2025-08-26 RX ORDER — FUROSEMIDE 20 MG/1
20 TABLET ORAL DAILY
Qty: 90 TABLET | Refills: 3 | Status: SHIPPED | OUTPATIENT
Start: 2025-08-26

## 2025-08-26 RX ORDER — OMEPRAZOLE 20 MG/1
20 CAPSULE, DELAYED RELEASE ORAL DAILY
Qty: 90 CAPSULE | Refills: 1 | Status: SHIPPED | OUTPATIENT
Start: 2025-08-26 | End: 2026-02-22

## 2025-08-26 RX ORDER — CALCIUM CARBONATE/VITAMIN D3 600MG-5MCG
1 TABLET ORAL DAILY
Qty: 90 TABLET | Refills: 3 | Status: SHIPPED | OUTPATIENT
Start: 2025-08-26

## 2025-08-26 RX ORDER — LISINOPRIL AND HYDROCHLOROTHIAZIDE 10; 12.5 MG/1; MG/1
1 TABLET ORAL DAILY
Qty: 30 TABLET | Refills: 11 | Status: SHIPPED | OUTPATIENT
Start: 2025-08-26 | End: 2026-08-26

## 2025-08-26 ASSESSMENT — ENCOUNTER SYMPTOMS
LIGHT-HEADEDNESS: 0
LOSS OF SENSATION IN FEET: 0
CHEST TIGHTNESS: 0
DIFFICULTY URINATING: 0
CHILLS: 0
RHINORRHEA: 0
DIZZINESS: 0
SHORTNESS OF BREATH: 0
DEPRESSION: 0
FEVER: 0
OCCASIONAL FEELINGS OF UNSTEADINESS: 0

## 2025-09-03 ENCOUNTER — APPOINTMENT (OUTPATIENT)
Dept: RHEUMATOLOGY | Facility: CLINIC | Age: 75
End: 2025-09-03
Payer: MEDICARE

## 2025-09-04 ENCOUNTER — PATIENT MESSAGE (OUTPATIENT)
Dept: RHEUMATOLOGY | Facility: CLINIC | Age: 75
End: 2025-09-04
Payer: MEDICARE

## 2025-09-08 ENCOUNTER — APPOINTMENT (OUTPATIENT)
Dept: GASTROENTEROLOGY | Facility: EXTERNAL LOCATION | Age: 75
End: 2025-09-08
Payer: MEDICARE

## 2025-10-03 ENCOUNTER — APPOINTMENT (OUTPATIENT)
Dept: OPHTHALMOLOGY | Facility: CLINIC | Age: 75
End: 2025-10-03
Payer: MEDICARE

## 2025-10-08 ENCOUNTER — APPOINTMENT (OUTPATIENT)
Dept: RHEUMATOLOGY | Facility: CLINIC | Age: 75
End: 2025-10-08
Payer: MEDICARE

## 2025-10-09 ENCOUNTER — APPOINTMENT (OUTPATIENT)
Dept: PRIMARY CARE | Facility: CLINIC | Age: 75
End: 2025-10-09
Payer: MEDICARE

## 2026-04-03 ENCOUNTER — APPOINTMENT (OUTPATIENT)
Dept: PRIMARY CARE | Facility: CLINIC | Age: 76
End: 2026-04-03
Payer: MEDICARE